# Patient Record
Sex: FEMALE | Race: WHITE | Employment: FULL TIME | URBAN - METROPOLITAN AREA
[De-identification: names, ages, dates, MRNs, and addresses within clinical notes are randomized per-mention and may not be internally consistent; named-entity substitution may affect disease eponyms.]

---

## 2017-02-24 PROBLEM — E03.9 ACQUIRED HYPOTHYROIDISM: Status: ACTIVE | Noted: 2017-02-24

## 2018-03-19 ENCOUNTER — HOSPITAL ENCOUNTER (OUTPATIENT)
Age: 33
Setting detail: SPECIMEN
Discharge: HOME | End: 2018-03-19
Payer: COMMERCIAL

## 2018-03-19 ENCOUNTER — OFFICE VISIT (OUTPATIENT)
Dept: FAMILY MEDICINE CLINIC | Age: 33
End: 2018-03-19
Payer: COMMERCIAL

## 2018-03-19 VITALS
SYSTOLIC BLOOD PRESSURE: 126 MMHG | HEART RATE: 88 BPM | DIASTOLIC BLOOD PRESSURE: 86 MMHG | OXYGEN SATURATION: 99 % | RESPIRATION RATE: 16 BRPM | HEIGHT: 64 IN | TEMPERATURE: 98.3 F | WEIGHT: 170 LBS | BODY MASS INDEX: 29.02 KG/M2

## 2018-03-19 DIAGNOSIS — N63.20 LEFT BREAST MASS: ICD-10-CM

## 2018-03-19 DIAGNOSIS — Z01.419 WELL WOMAN EXAM WITH ROUTINE GYNECOLOGICAL EXAM: Primary | ICD-10-CM

## 2018-03-19 PROCEDURE — 99395 PREV VISIT EST AGE 18-39: CPT | Performed by: FAMILY MEDICINE

## 2018-03-19 PROCEDURE — 87591 N.GONORRHOEAE DNA AMP PROB: CPT

## 2018-03-19 PROCEDURE — 88175 CYTOPATH C/V AUTO FLUID REDO: CPT

## 2018-03-19 PROCEDURE — 87491 CHLMYD TRACH DNA AMP PROBE: CPT

## 2018-03-19 RX ORDER — DOCUSATE SODIUM 100 MG/1
100 CAPSULE, LIQUID FILLED ORAL DAILY
COMMUNITY
End: 2021-09-01

## 2018-03-21 LAB
CHLAMYDIA TRACHOMATIS AMPLIFIED DET: NORMAL
N GONORRHOEAE AMPLIFIED DET: NORMAL

## 2018-06-20 ENCOUNTER — OFFICE VISIT (OUTPATIENT)
Dept: FAMILY MEDICINE CLINIC | Age: 33
End: 2018-06-20
Payer: COMMERCIAL

## 2018-06-20 VITALS
OXYGEN SATURATION: 100 % | WEIGHT: 166 LBS | HEART RATE: 88 BPM | SYSTOLIC BLOOD PRESSURE: 116 MMHG | DIASTOLIC BLOOD PRESSURE: 83 MMHG | HEIGHT: 64 IN | TEMPERATURE: 98.3 F | BODY MASS INDEX: 28.34 KG/M2 | RESPIRATION RATE: 18 BRPM
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
## 2018-06-20 DIAGNOSIS — M79.632 PAIN OF LEFT FOREARM: Primary | ICD-10-CM

## 2018-06-20 PROCEDURE — 90471 IMMUNIZATION ADMIN: CPT | Performed by: FAMILY MEDICINE

## 2018-06-20 PROCEDURE — G8427 DOCREV CUR MEDS BY ELIG CLIN: HCPCS | Performed by: FAMILY MEDICINE

## 2018-06-20 PROCEDURE — G8417 CALC BMI ABV UP PARAM F/U: HCPCS | Performed by: FAMILY MEDICINE

## 2018-06-20 PROCEDURE — 90715 TDAP VACCINE 7 YRS/> IM: CPT | Performed by: FAMILY MEDICINE

## 2018-06-20 PROCEDURE — 1036F TOBACCO NON-USER: CPT | Performed by: FAMILY MEDICINE

## 2018-06-20 PROCEDURE — 99213 OFFICE O/P EST LOW 20 MIN: CPT | Performed by: FAMILY MEDICINE

## 2018-06-20 RX ORDER — AMOXICILLIN AND CLAVULANATE POTASSIUM 875; 125 MG/1; MG/1
1 TABLET, FILM COATED ORAL 2 TIMES DAILY
Qty: 28 TABLET | Refills: 0 | Status: SHIPPED | OUTPATIENT
Start: 2018-06-20 | End: 2018-07-04

## 2018-08-06 ENCOUNTER — NURSE ONLY (OUTPATIENT)
Dept: FAMILY MEDICINE CLINIC | Age: 33
End: 2018-08-06

## 2018-08-06 ENCOUNTER — HOSPITAL ENCOUNTER (OUTPATIENT)
Age: 33
Discharge: HOME | End: 2018-08-08
Payer: COMMERCIAL

## 2018-08-06 DIAGNOSIS — E03.9 ACQUIRED HYPOTHYROIDISM: ICD-10-CM

## 2018-08-06 DIAGNOSIS — E53.8 B12 DEFICIENCY: ICD-10-CM

## 2018-08-06 DIAGNOSIS — E55.9 VITAMIN D DEFICIENCY: ICD-10-CM

## 2018-08-06 DIAGNOSIS — E03.9 ACQUIRED HYPOTHYROIDISM: Primary | ICD-10-CM

## 2018-08-06 LAB
ALBUMIN SERPL-MCNC: 4.1 G/DL (ref 3.5–5.2)
ALP BLD-CCNC: 85 U/L (ref 35–104)
ALT SERPL-CCNC: 22 U/L (ref 0–32)
ANION GAP SERPL CALCULATED.3IONS-SCNC: 12 MMOL/L (ref 7–16)
AST SERPL-CCNC: 27 U/L (ref 0–31)
BILIRUB SERPL-MCNC: 0.4 MG/DL (ref 0–1.2)
BUN BLDV-MCNC: 12 MG/DL (ref 6–20)
CALCIUM SERPL-MCNC: 9.1 MG/DL (ref 8.6–10.2)
CHLORIDE BLD-SCNC: 99 MMOL/L (ref 98–107)
CO2: 30 MMOL/L (ref 22–29)
CREAT SERPL-MCNC: 0.8 MG/DL (ref 0.5–1)
FOLATE: >20 NG/ML (ref 4.8–24.2)
GFR AFRICAN AMERICAN: >60
GFR NON-AFRICAN AMERICAN: >60 ML/MIN/1.73
GLUCOSE BLD-MCNC: 79 MG/DL (ref 74–109)
HCT VFR BLD CALC: 45.4 % (ref 34–48)
HEMOGLOBIN: 14.2 G/DL (ref 11.5–15.5)
MCH RBC QN AUTO: 30.9 PG (ref 26–35)
MCHC RBC AUTO-ENTMCNC: 31.3 % (ref 32–34.5)
MCV RBC AUTO: 98.7 FL (ref 80–99.9)
PDW BLD-RTO: 12.4 FL (ref 11.5–15)
PLATELET # BLD: 207 E9/L (ref 130–450)
PMV BLD AUTO: 10.3 FL (ref 7–12)
POTASSIUM SERPL-SCNC: 5 MMOL/L (ref 3.5–5)
RBC # BLD: 4.6 E12/L (ref 3.5–5.5)
SODIUM BLD-SCNC: 141 MMOL/L (ref 132–146)
TOTAL PROTEIN: 6.9 G/DL (ref 6.4–8.3)
TSH SERPL DL<=0.05 MIU/L-ACNC: 3.78 UIU/ML (ref 0.27–4.2)
VITAMIN B-12: 1184 PG/ML (ref 211–946)
VITAMIN D 25-HYDROXY: 75 NG/ML (ref 30–100)
WBC # BLD: 6.5 E9/L (ref 4.5–11.5)

## 2018-08-06 PROCEDURE — 85027 COMPLETE CBC AUTOMATED: CPT

## 2018-08-06 PROCEDURE — 82607 VITAMIN B-12: CPT

## 2018-08-06 PROCEDURE — 82306 VITAMIN D 25 HYDROXY: CPT

## 2018-08-06 PROCEDURE — 82746 ASSAY OF FOLIC ACID SERUM: CPT

## 2018-08-06 PROCEDURE — 80053 COMPREHEN METABOLIC PANEL: CPT

## 2018-08-06 PROCEDURE — 84443 ASSAY THYROID STIM HORMONE: CPT

## 2018-08-13 ENCOUNTER — OFFICE VISIT (OUTPATIENT)
Dept: FAMILY MEDICINE CLINIC | Age: 33
End: 2018-08-13
Payer: COMMERCIAL

## 2018-08-13 VITALS
HEART RATE: 90 BPM | OXYGEN SATURATION: 98 % | WEIGHT: 163 LBS | RESPIRATION RATE: 16 BRPM | SYSTOLIC BLOOD PRESSURE: 119 MMHG | DIASTOLIC BLOOD PRESSURE: 80 MMHG | BODY MASS INDEX: 27.83 KG/M2 | HEIGHT: 64 IN

## 2018-08-13 DIAGNOSIS — E03.9 ACQUIRED HYPOTHYROIDISM: Primary | ICD-10-CM

## 2018-08-13 PROCEDURE — 1036F TOBACCO NON-USER: CPT | Performed by: FAMILY MEDICINE

## 2018-08-13 PROCEDURE — 99213 OFFICE O/P EST LOW 20 MIN: CPT | Performed by: FAMILY MEDICINE

## 2018-08-13 PROCEDURE — G8417 CALC BMI ABV UP PARAM F/U: HCPCS | Performed by: FAMILY MEDICINE

## 2018-08-13 PROCEDURE — G8427 DOCREV CUR MEDS BY ELIG CLIN: HCPCS | Performed by: FAMILY MEDICINE

## 2018-08-13 RX ORDER — ALBUTEROL SULFATE 90 UG/1
2 AEROSOL, METERED RESPIRATORY (INHALATION) EVERY 6 HOURS PRN
Qty: 3 INHALER | Refills: 3 | Status: SHIPPED
Start: 2018-08-13 | End: 2020-04-09 | Stop reason: SDUPTHER

## 2018-08-13 RX ORDER — LEVOTHYROXINE SODIUM 0.07 MG/1
75 TABLET ORAL DAILY
Qty: 90 TABLET | Refills: 3 | Status: SHIPPED | OUTPATIENT
Start: 2018-08-13 | End: 2019-02-11 | Stop reason: SDUPTHER

## 2018-08-13 ASSESSMENT — PATIENT HEALTH QUESTIONNAIRE - PHQ9
2. FEELING DOWN, DEPRESSED OR HOPELESS: 0
1. LITTLE INTEREST OR PLEASURE IN DOING THINGS: 0
SUM OF ALL RESPONSES TO PHQ9 QUESTIONS 1 & 2: 0
SUM OF ALL RESPONSES TO PHQ QUESTIONS 1-9: 0
SUM OF ALL RESPONSES TO PHQ QUESTIONS 1-9: 0

## 2019-02-08 ENCOUNTER — NURSE ONLY (OUTPATIENT)
Dept: FAMILY MEDICINE CLINIC | Age: 34
End: 2019-02-08
Payer: COMMERCIAL

## 2019-02-08 ENCOUNTER — HOSPITAL ENCOUNTER (OUTPATIENT)
Age: 34
Discharge: HOME | End: 2019-02-10
Payer: COMMERCIAL

## 2019-02-08 DIAGNOSIS — E03.9 ACQUIRED HYPOTHYROIDISM: ICD-10-CM

## 2019-02-08 LAB
ALBUMIN SERPL-MCNC: 4.1 G/DL (ref 3.5–5.2)
ALP BLD-CCNC: 75 U/L (ref 35–104)
ALT SERPL-CCNC: 32 U/L (ref 0–32)
ANION GAP SERPL CALCULATED.3IONS-SCNC: 12 MMOL/L (ref 7–16)
AST SERPL-CCNC: 38 U/L (ref 0–31)
BILIRUB SERPL-MCNC: 0.4 MG/DL (ref 0–1.2)
BUN BLDV-MCNC: 14 MG/DL (ref 6–20)
CALCIUM SERPL-MCNC: 9 MG/DL (ref 8.6–10.2)
CHLORIDE BLD-SCNC: 101 MMOL/L (ref 98–107)
CHOLESTEROL, TOTAL: 177 MG/DL (ref 0–199)
CO2: 22 MMOL/L (ref 22–29)
CREAT SERPL-MCNC: 0.8 MG/DL (ref 0.5–1)
GFR AFRICAN AMERICAN: >60
GFR NON-AFRICAN AMERICAN: >60 ML/MIN/1.73
GLUCOSE BLD-MCNC: 83 MG/DL (ref 74–99)
HDLC SERPL-MCNC: 70 MG/DL
LDL CHOLESTEROL CALCULATED: 97 MG/DL (ref 0–99)
POTASSIUM SERPL-SCNC: 4.3 MMOL/L (ref 3.5–5)
SODIUM BLD-SCNC: 135 MMOL/L (ref 132–146)
TOTAL PROTEIN: 7 G/DL (ref 6.4–8.3)
TRIGL SERPL-MCNC: 50 MG/DL (ref 0–149)
TSH SERPL DL<=0.05 MIU/L-ACNC: 5.32 UIU/ML (ref 0.27–4.2)
VLDLC SERPL CALC-MCNC: 10 MG/DL

## 2019-02-08 PROCEDURE — 80053 COMPREHEN METABOLIC PANEL: CPT

## 2019-02-08 PROCEDURE — 36415 COLL VENOUS BLD VENIPUNCTURE: CPT | Performed by: FAMILY MEDICINE

## 2019-02-08 PROCEDURE — 80061 LIPID PANEL: CPT

## 2019-02-08 PROCEDURE — 84443 ASSAY THYROID STIM HORMONE: CPT

## 2019-02-11 ENCOUNTER — OFFICE VISIT (OUTPATIENT)
Dept: FAMILY MEDICINE CLINIC | Age: 34
End: 2019-02-11
Payer: COMMERCIAL

## 2019-02-11 VITALS
HEART RATE: 94 BPM | HEIGHT: 64 IN | OXYGEN SATURATION: 100 % | SYSTOLIC BLOOD PRESSURE: 117 MMHG | WEIGHT: 173 LBS | BODY MASS INDEX: 29.53 KG/M2 | DIASTOLIC BLOOD PRESSURE: 80 MMHG | RESPIRATION RATE: 16 BRPM

## 2019-02-11 DIAGNOSIS — Z00.00 WELL ADULT EXAM: Primary | ICD-10-CM

## 2019-02-11 PROCEDURE — 1036F TOBACCO NON-USER: CPT | Performed by: FAMILY MEDICINE

## 2019-02-11 PROCEDURE — G8417 CALC BMI ABV UP PARAM F/U: HCPCS | Performed by: FAMILY MEDICINE

## 2019-02-11 PROCEDURE — G8484 FLU IMMUNIZE NO ADMIN: HCPCS | Performed by: FAMILY MEDICINE

## 2019-02-11 PROCEDURE — G8427 DOCREV CUR MEDS BY ELIG CLIN: HCPCS | Performed by: FAMILY MEDICINE

## 2019-02-11 PROCEDURE — 99395 PREV VISIT EST AGE 18-39: CPT | Performed by: FAMILY MEDICINE

## 2019-02-11 RX ORDER — LEVOTHYROXINE SODIUM 88 UG/1
88 TABLET ORAL DAILY
Qty: 90 TABLET | Refills: 3 | Status: SHIPPED | OUTPATIENT
Start: 2019-02-11 | End: 2020-01-20

## 2019-04-08 ENCOUNTER — HOSPITAL ENCOUNTER (OUTPATIENT)
Age: 34
Discharge: HOME | End: 2019-04-10
Payer: COMMERCIAL

## 2019-04-08 ENCOUNTER — NURSE ONLY (OUTPATIENT)
Dept: FAMILY MEDICINE CLINIC | Age: 34
End: 2019-04-08

## 2019-04-08 DIAGNOSIS — E03.9 ACQUIRED HYPOTHYROIDISM: ICD-10-CM

## 2019-04-08 DIAGNOSIS — E03.9 ACQUIRED HYPOTHYROIDISM: Primary | ICD-10-CM

## 2019-04-08 PROCEDURE — 84443 ASSAY THYROID STIM HORMONE: CPT

## 2019-04-09 LAB — TSH SERPL DL<=0.05 MIU/L-ACNC: 2.97 UIU/ML (ref 0.27–4.2)

## 2019-05-25 ENCOUNTER — HOSPITAL ENCOUNTER (EMERGENCY)
Age: 34
Discharge: HOME | End: 2019-05-25
Attending: EMERGENCY MEDICINE
Payer: COMMERCIAL

## 2019-05-25 VITALS
DIASTOLIC BLOOD PRESSURE: 84 MMHG | OXYGEN SATURATION: 98 % | BODY MASS INDEX: 29.02 KG/M2 | WEIGHT: 170 LBS | HEIGHT: 64 IN | TEMPERATURE: 98.6 F | RESPIRATION RATE: 18 BRPM | SYSTOLIC BLOOD PRESSURE: 128 MMHG | HEART RATE: 100 BPM

## 2019-05-25 DIAGNOSIS — S05.01XA ABRASION OF RIGHT CORNEA, INITIAL ENCOUNTER: Primary | ICD-10-CM

## 2019-05-25 PROCEDURE — 99283 EMERGENCY DEPT VISIT LOW MDM: CPT

## 2019-05-25 PROCEDURE — 6370000000 HC RX 637 (ALT 250 FOR IP): Performed by: EMERGENCY MEDICINE

## 2019-05-25 PROCEDURE — 6370000000 HC RX 637 (ALT 250 FOR IP)

## 2019-05-25 RX ORDER — TETRACAINE HYDROCHLORIDE 5 MG/ML
1 SOLUTION OPHTHALMIC ONCE
Status: COMPLETED | OUTPATIENT
Start: 2019-05-25 | End: 2019-05-25

## 2019-05-25 RX ORDER — NEOMYCIN SULFATE, POLYMYXIN B SULFATE AND DEXAMETHASONE 3.5; 10000; 1 MG/ML; [USP'U]/ML; MG/ML
1 SUSPENSION/ DROPS OPHTHALMIC ONCE
Status: COMPLETED | OUTPATIENT
Start: 2019-05-25 | End: 2019-05-25

## 2019-05-25 RX ORDER — TETRACAINE HYDROCHLORIDE 5 MG/ML
SOLUTION OPHTHALMIC
Status: COMPLETED
Start: 2019-05-25 | End: 2019-05-25

## 2019-05-25 RX ADMIN — FLUORESCEIN SODIUM 1 MG: 1 STRIP OPHTHALMIC at 23:20

## 2019-05-25 RX ADMIN — TETRACAINE HYDROCHLORIDE 1 DROP: 5 SOLUTION OPHTHALMIC at 23:20

## 2019-05-25 RX ADMIN — NEOMYCIN SULFATE, POLYMYXIN B SULFATE AND DEXAMETHASONE 1 DROP: 3.5; 10000; 1 SUSPENSION/ DROPS OPHTHALMIC at 23:20

## 2019-05-25 ASSESSMENT — PAIN DESCRIPTION - FREQUENCY: FREQUENCY: CONTINUOUS

## 2019-05-25 ASSESSMENT — PAIN DESCRIPTION - DESCRIPTORS: DESCRIPTORS: SHARP

## 2019-05-25 ASSESSMENT — PAIN SCALES - GENERAL: PAINLEVEL_OUTOF10: 4

## 2019-05-25 ASSESSMENT — PAIN DESCRIPTION - ORIENTATION: ORIENTATION: RIGHT

## 2019-05-25 ASSESSMENT — PAIN DESCRIPTION - PAIN TYPE: TYPE: ACUTE PAIN

## 2019-05-25 ASSESSMENT — PAIN DESCRIPTION - LOCATION: LOCATION: EYE

## 2019-05-25 ASSESSMENT — PAIN DESCRIPTION - PROGRESSION: CLINICAL_PROGRESSION: GRADUALLY WORSENING

## 2019-05-26 NOTE — ED PROVIDER NOTES
Department of Emergency Medicine   ED  Provider Note  Admit Date/RoomTime: 5/25/2019 11:02 PM  ED Room: 06/06          History of Present Illness:  5/25/19, Time: 11:10 PM         Kirti Camejo is a 36 y.o. female presenting to the ED for foreign body, beginning prior to arrival.  The complaint has been persistent, moderate in severity, and worsened by movement of right eye. Pt states that she was putting food in her cabinet when a glass broke. States that small pieces of it got into her right eye. Pt was wearing contacts at that time. States that since then, she has had pain upon moving her eye and redness. Pt did flushed her eye but continued to have pain. Denies chest pain, shortness of breath, fever, chills, abdominal pain, nausea, emesis, diarrhea, and further complaints at this time. Review of Systems:   Pertinent positives and negatives are stated within HPI, all other systems reviewed and are negative.    --------------------------------------------- PAST HISTORY ---------------------------------------------  Past Medical History:  has a past medical history of Allergic rhinitis, Anxiety, Depression, Hypothyroidism, Obesity, and Sleep apnea. Past Surgical History:  has a past surgical history that includes Bariatric Surgery (06/14/2016) and Foot surgery (12/10/2015). Social History:  reports that she has never smoked. She has never used smokeless tobacco. She reports that she does not use drugs. Family History: family history includes Bipolar Disorder in her sister; Breast Cancer in her paternal grandmother; Depression in her father and mother; Heart Attack in her paternal grandfather; High Blood Pressure in her father; No Known Problems in her sister; Thyroid Disease in her mother. The patients home medications have been reviewed.     Allergies: Adhesive tape    -------------------------------------------------- RESULTS -------------------------------------------------  All laboratory and radiology results have been personally reviewed by myself   LABS:  No results found for this visit on 05/25/19. RADIOLOGY:  Interpreted by Radiologist.  No orders to display       ------------------------- NURSING NOTES AND VITALS REVIEWED ---------------------------   The nursing notes within the ED encounter and vital signs as below have been reviewed. /84   Pulse 100   Temp 98.6 °F (37 °C) (Oral)   Resp 18   Ht 5' 4\" (1.626 m)   Wt 170 lb (77.1 kg)   SpO2 98%   BMI 29.18 kg/m²   Oxygen Saturation Interpretation: Normal    ---------------------------------------------------PHYSICAL EXAM--------------------------------------    Constitutional/General: Alert and oriented x3, well appearing, non toxic in NAD  Head: NC/AT  Eyes: PERRL, EOMI  Mouth: Oropharynx clear, handling secretions  Neck: Supple, full ROM,   Pulmonary: Lungs clear to auscultation bilaterally, no wheezes, rales, or rhonchi. Not in respiratory distress  Cardiovascular:  Regular rate and rhythm, no murmurs, gallops, or rubs. 2+ distal pulses  Abdomen: Soft, non tender, non distended,   Musculoskeletal: Moves all extremities x 4. Warm and well perfused  Skin: warm and dry without rash  Neurologic: GCS 15,  Psych: Normal Affect      ------------------------------ ED COURSE/MEDICAL DECISION MAKING----------------------  Medications   neomycin-polymyxin-dexameth (MAXITROL) 3.5-34619-8.1 ophthalmic suspension 1 drop (1 drop Right Eye Given 5/25/19 2320)   tetracaine (TETRAVISC) 0.5 % ophthalmic solution 1 drop (1 drop Right Eye Given 5/25/19 2320)   fluorescein ophthalmic strip 1 mg (1 mg Right Eye Given 5/25/19 2320)       Medical Decision Making:    Patient comes to the ED with foreign body in her right eye. States that a piece of broken glass got in her right eye. Was wearing contacts at that time. Continued to have pain and redness after flushing eye at home.  Had fluorescein eye exam which showed small superficial lacerations to the 4-5 o'clock position. No glass or foreign body seen. Patient feels better after medicating with Maxitrol. Will be discharged with medication. Also advised to have follow up with PCP and eye doctor. FLUORESCEIN EYE EXAM:  Performed By: Lu Cortes DO. Location:  Right Eye  Procedure: Tetracaine was used for local anesthesia. Fluorescein strip was used. The lid was everted. Full extraocular motions were evaluated and are intact. Flourescein stain: Positive. Findings: Has small superficial lacerations to the 4 to 5 o'clock position. No foreign body was found. Counseling: The emergency provider has spoken with the patient and discussed todays results, in addition to providing specific details for the plan of care and counseling regarding the diagnosis and prognosis. Questions are answered at this time and they are agreeable with the plan.      --------------------------------- IMPRESSION AND DISPOSITION ---------------------------------    IMPRESSION  1. Abrasion of right cornea, initial encounter        DISPOSITION  Disposition: Discharge to home  Patient condition is stable    5/25/19, 11:10 PM.    This note is prepared by Ankita Sellers acting as Scribe for Lu Cortes DO. Lu Cortes DO:  The scribe's documentation has been prepared under my direction and personally reviewed by me in its entirety. I confirm that the note above accurately reflects all work, treatment, procedures, and medical decision making performed by me.            Lu Cortes DO  05/26/19 7401

## 2019-05-26 NOTE — ED NOTES
Right eye flushed with NSS via Chela Lowe lens. 1 drop Maxitrol to right eye as ordered.      Chelsey Houston RN  05/25/19 [id number]

## 2019-05-26 NOTE — ED NOTES
Right eye irrigation initiated with ning lens per verbal order from Dr. Arthur German with 500 cc of NS.        Dolly Briceño RN  05/25/19 1784

## 2019-05-26 NOTE — ED NOTES
Instructions provided and questions answered. Eye drops provided for home with instruction.      Isaak Healy RN  05/25/19 7155

## 2020-01-20 RX ORDER — LEVOTHYROXINE SODIUM 88 UG/1
TABLET ORAL
Qty: 90 TABLET | Refills: 4 | Status: SHIPPED
Start: 2020-01-20 | End: 2021-04-14

## 2020-04-09 ENCOUNTER — HOSPITAL ENCOUNTER (OUTPATIENT)
Age: 35
Discharge: HOME | End: 2020-04-09
Payer: COMMERCIAL

## 2020-04-09 LAB
ALBUMIN SERPL-MCNC: 4.3 G/DL (ref 3.5–5.2)
ALP BLD-CCNC: 99 U/L (ref 35–104)
ALT SERPL-CCNC: 36 U/L (ref 0–32)
ANION GAP SERPL CALCULATED.3IONS-SCNC: 11 MMOL/L (ref 7–16)
AST SERPL-CCNC: 34 U/L (ref 0–31)
BILIRUB SERPL-MCNC: 0.3 MG/DL (ref 0–1.2)
BUN BLDV-MCNC: 16 MG/DL (ref 6–20)
CALCIUM SERPL-MCNC: 9.4 MG/DL (ref 8.6–10.2)
CHLORIDE BLD-SCNC: 101 MMOL/L (ref 98–107)
CO2: 26 MMOL/L (ref 22–29)
CREAT SERPL-MCNC: 0.8 MG/DL (ref 0.5–1)
GFR AFRICAN AMERICAN: >60
GFR NON-AFRICAN AMERICAN: >60 ML/MIN/1.73
GLUCOSE BLD-MCNC: 116 MG/DL (ref 74–99)
HCT VFR BLD CALC: 47 % (ref 34–48)
HEMOGLOBIN: 15.6 G/DL (ref 11.5–15.5)
MCH RBC QN AUTO: 31.1 PG (ref 26–35)
MCHC RBC AUTO-ENTMCNC: 33.2 % (ref 32–34.5)
MCV RBC AUTO: 93.8 FL (ref 80–99.9)
PDW BLD-RTO: 11.7 FL (ref 11.5–15)
PLATELET # BLD: 217 E9/L (ref 130–450)
PMV BLD AUTO: 9.8 FL (ref 7–12)
POTASSIUM SERPL-SCNC: 4 MMOL/L (ref 3.5–5)
RBC # BLD: 5.01 E12/L (ref 3.5–5.5)
SODIUM BLD-SCNC: 138 MMOL/L (ref 132–146)
TOTAL PROTEIN: 7.3 G/DL (ref 6.4–8.3)
WBC # BLD: 6.4 E9/L (ref 4.5–11.5)

## 2020-04-09 PROCEDURE — 36415 COLL VENOUS BLD VENIPUNCTURE: CPT

## 2020-04-09 PROCEDURE — 84443 ASSAY THYROID STIM HORMONE: CPT

## 2020-04-09 PROCEDURE — 80053 COMPREHEN METABOLIC PANEL: CPT

## 2020-04-09 PROCEDURE — 85027 COMPLETE CBC AUTOMATED: CPT

## 2020-04-10 LAB — TSH SERPL DL<=0.05 MIU/L-ACNC: 2.76 UIU/ML (ref 0.27–4.2)

## 2021-04-14 RX ORDER — LEVOTHYROXINE SODIUM 88 UG/1
TABLET ORAL
Qty: 90 TABLET | Refills: 3 | Status: SHIPPED
Start: 2021-04-14 | End: 2021-04-21 | Stop reason: SDUPTHER

## 2021-04-21 ENCOUNTER — OFFICE VISIT (OUTPATIENT)
Dept: FAMILY MEDICINE CLINIC | Age: 36
End: 2021-04-21
Payer: COMMERCIAL

## 2021-04-21 VITALS
SYSTOLIC BLOOD PRESSURE: 124 MMHG | HEART RATE: 98 BPM | TEMPERATURE: 97.7 F | WEIGHT: 179 LBS | HEIGHT: 64 IN | BODY MASS INDEX: 30.56 KG/M2 | OXYGEN SATURATION: 99 % | RESPIRATION RATE: 18 BRPM | DIASTOLIC BLOOD PRESSURE: 70 MMHG

## 2021-04-21 DIAGNOSIS — F41.9 ANXIETY: ICD-10-CM

## 2021-04-21 DIAGNOSIS — Z00.00 HEALTHCARE MAINTENANCE: ICD-10-CM

## 2021-04-21 DIAGNOSIS — F32.5 MAJOR DEPRESSIVE DISORDER WITH SINGLE EPISODE, IN FULL REMISSION (HCC): ICD-10-CM

## 2021-04-21 DIAGNOSIS — Z76.89 ENCOUNTER TO ESTABLISH CARE: ICD-10-CM

## 2021-04-21 DIAGNOSIS — R73.9 ELEVATED BLOOD SUGAR: ICD-10-CM

## 2021-04-21 DIAGNOSIS — E55.9 VITAMIN D DEFICIENCY, UNSPECIFIED: ICD-10-CM

## 2021-04-21 DIAGNOSIS — E03.9 ACQUIRED HYPOTHYROIDISM: ICD-10-CM

## 2021-04-21 DIAGNOSIS — Z01.84 IMMUNITY STATUS TESTING: ICD-10-CM

## 2021-04-21 DIAGNOSIS — E03.9 ACQUIRED HYPOTHYROIDISM: Primary | ICD-10-CM

## 2021-04-21 DIAGNOSIS — J30.2 SEASONAL ALLERGIES: ICD-10-CM

## 2021-04-21 DIAGNOSIS — Z98.84 HISTORY OF GASTRIC RESTRICTIVE SURGERY: ICD-10-CM

## 2021-04-21 DIAGNOSIS — J45.990 EXERCISE-INDUCED ASTHMA: ICD-10-CM

## 2021-04-21 LAB
ALBUMIN SERPL-MCNC: 4.3 G/DL (ref 3.5–5.2)
ALP BLD-CCNC: 97 U/L (ref 35–104)
ALT SERPL-CCNC: 29 U/L (ref 0–32)
ANION GAP SERPL CALCULATED.3IONS-SCNC: 9 MMOL/L (ref 7–16)
AST SERPL-CCNC: 32 U/L (ref 0–31)
BASOPHILS ABSOLUTE: 0.03 E9/L (ref 0–0.2)
BASOPHILS RELATIVE PERCENT: 0.7 % (ref 0–2)
BILIRUB SERPL-MCNC: 0.3 MG/DL (ref 0–1.2)
BUN BLDV-MCNC: 14 MG/DL (ref 6–20)
CALCIUM SERPL-MCNC: 9.3 MG/DL (ref 8.6–10.2)
CHLORIDE BLD-SCNC: 102 MMOL/L (ref 98–107)
CHOLESTEROL, TOTAL: 199 MG/DL (ref 0–199)
CO2: 28 MMOL/L (ref 22–29)
CREAT SERPL-MCNC: 0.8 MG/DL (ref 0.5–1)
EOSINOPHILS ABSOLUTE: 0.01 E9/L (ref 0.05–0.5)
EOSINOPHILS RELATIVE PERCENT: 0.2 % (ref 0–6)
GFR AFRICAN AMERICAN: >60
GFR NON-AFRICAN AMERICAN: >60 ML/MIN/1.73
GLUCOSE BLD-MCNC: 80 MG/DL (ref 74–99)
HBA1C MFR BLD: 5 % (ref 4–5.6)
HCT VFR BLD CALC: 49 % (ref 34–48)
HDLC SERPL-MCNC: 67 MG/DL
HEMOGLOBIN: 15.8 G/DL (ref 11.5–15.5)
IMMATURE GRANULOCYTES #: 0 E9/L
IMMATURE GRANULOCYTES %: 0 % (ref 0–5)
LDL CHOLESTEROL CALCULATED: 122 MG/DL (ref 0–99)
LYMPHOCYTES ABSOLUTE: 1.38 E9/L (ref 1.5–4)
LYMPHOCYTES RELATIVE PERCENT: 32.9 % (ref 20–42)
MCH RBC QN AUTO: 30.5 PG (ref 26–35)
MCHC RBC AUTO-ENTMCNC: 32.2 % (ref 32–34.5)
MCV RBC AUTO: 94.6 FL (ref 80–99.9)
MONOCYTES ABSOLUTE: 0.35 E9/L (ref 0.1–0.95)
MONOCYTES RELATIVE PERCENT: 8.4 % (ref 2–12)
NEUTROPHILS ABSOLUTE: 2.42 E9/L (ref 1.8–7.3)
NEUTROPHILS RELATIVE PERCENT: 57.8 % (ref 43–80)
PDW BLD-RTO: 12 FL (ref 11.5–15)
PLATELET # BLD: 261 E9/L (ref 130–450)
PMV BLD AUTO: 10.2 FL (ref 7–12)
POTASSIUM SERPL-SCNC: 4.2 MMOL/L (ref 3.5–5)
RBC # BLD: 5.18 E12/L (ref 3.5–5.5)
SODIUM BLD-SCNC: 139 MMOL/L (ref 132–146)
TOTAL PROTEIN: 7.5 G/DL (ref 6.4–8.3)
TRIGL SERPL-MCNC: 52 MG/DL (ref 0–149)
TSH SERPL DL<=0.05 MIU/L-ACNC: 2.52 UIU/ML (ref 0.27–4.2)
VITAMIN D 25-HYDROXY: 90 NG/ML (ref 30–100)
VLDLC SERPL CALC-MCNC: 10 MG/DL
WBC # BLD: 4.2 E9/L (ref 4.5–11.5)

## 2021-04-21 PROCEDURE — 99214 OFFICE O/P EST MOD 30 MIN: CPT | Performed by: FAMILY MEDICINE

## 2021-04-21 PROCEDURE — G8417 CALC BMI ABV UP PARAM F/U: HCPCS | Performed by: FAMILY MEDICINE

## 2021-04-21 PROCEDURE — 1036F TOBACCO NON-USER: CPT | Performed by: FAMILY MEDICINE

## 2021-04-21 PROCEDURE — G8427 DOCREV CUR MEDS BY ELIG CLIN: HCPCS | Performed by: FAMILY MEDICINE

## 2021-04-21 RX ORDER — LEVOTHYROXINE SODIUM 88 UG/1
88 TABLET ORAL DAILY
Qty: 90 TABLET | Refills: 3 | Status: SHIPPED
Start: 2021-04-21 | End: 2022-01-19 | Stop reason: SDUPTHER

## 2021-04-21 SDOH — ECONOMIC STABILITY: TRANSPORTATION INSECURITY
IN THE PAST 12 MONTHS, HAS THE LACK OF TRANSPORTATION KEPT YOU FROM MEDICAL APPOINTMENTS OR FROM GETTING MEDICATIONS?: NO

## 2021-04-21 SDOH — HEALTH STABILITY: MENTAL HEALTH: HOW OFTEN DO YOU HAVE A DRINK CONTAINING ALCOHOL?: NEVER

## 2021-04-21 SDOH — ECONOMIC STABILITY: INCOME INSECURITY: HOW HARD IS IT FOR YOU TO PAY FOR THE VERY BASICS LIKE FOOD, HOUSING, MEDICAL CARE, AND HEATING?: NOT HARD AT ALL

## 2021-04-21 SDOH — HEALTH STABILITY: MENTAL HEALTH: HOW MANY STANDARD DRINKS CONTAINING ALCOHOL DO YOU HAVE ON A TYPICAL DAY?: NOT ASKED

## 2021-04-21 SDOH — ECONOMIC STABILITY: TRANSPORTATION INSECURITY
IN THE PAST 12 MONTHS, HAS LACK OF TRANSPORTATION KEPT YOU FROM MEETINGS, WORK, OR FROM GETTING THINGS NEEDED FOR DAILY LIVING?: NO

## 2021-04-21 SDOH — ECONOMIC STABILITY: FOOD INSECURITY: WITHIN THE PAST 12 MONTHS, YOU WORRIED THAT YOUR FOOD WOULD RUN OUT BEFORE YOU GOT MONEY TO BUY MORE.: NEVER TRUE

## 2021-04-21 ASSESSMENT — PATIENT HEALTH QUESTIONNAIRE - PHQ9
SUM OF ALL RESPONSES TO PHQ QUESTIONS 1-9: 0
SUM OF ALL RESPONSES TO PHQ QUESTIONS 1-9: 0
2. FEELING DOWN, DEPRESSED OR HOPELESS: 0
SUM OF ALL RESPONSES TO PHQ9 QUESTIONS 1 & 2: 0
1. LITTLE INTEREST OR PLEASURE IN DOING THINGS: 0

## 2021-04-21 NOTE — PROGRESS NOTES
CC: Jaylin Briseno is a 29 y.o. yo female here for evaluation of the following medical concerns: New Patient      HPI:    Establish care    Hypothyroidism - on synthroi 88mcg  Exercise induced asthma - on albuterol PNR  Seasonal allergies - on zyrtec  Anxiety and depression - on wellubtrin and effexor; [facility]  She has a hx of hospitalization 2005 for SI; had 2 weeks of OP therapy and then started these medications and she is reluctant about discontinuing them; Cannot do effexor xr odue to hx of gastric sleeve  S/p Gastric sleeve 2016 at [facility] - weight has been mostly steady; did gain 10lbs from covid; has nutritionist plan; not consistent with what she eats; 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  ZIGGY resolved after gastric sleeve      ROS negative unless otherwise noted    Vitals:  /70   Pulse 98   Temp 97.7 °F (36.5 °C)   Resp 18   Ht 5' 4\" (1.626 m)   Wt 179 lb (81.2 kg)   SpO2 99%   BMI 30.73 kg/m²   Wt Readings from Last 3 Encounters:   04/21/21 179 lb (81.2 kg)   05/25/19 170 lb (77.1 kg)   02/11/19 173 lb (78.5 kg)       Physical Exam:  Constitutional - alert, well appearing, and in no distress  Eyes - extraocular eye movements intact, left eye normal, right eye normal, no conjunctivitis noted  Neck - supple, no significant adenopathy; thyroid exam: thyroid is normal in size without nodules or tenderness  Respiratory- clear to auscultation, no wheezes, rales or rhonchi, symmetric air entry; no increased work of breathing  Cardiovascular - normal rate, regular rhythm, normal S1, S2, no murmurs, rubs, clicks or gallops;    Extremities - no edema noted  Abdomen - soft, nontender, nondistended  Musculoskeletal - gait normal; no clubbing, cyanosis of extremities noted; no joint deformity or swelling noted  Skin - normal coloration and turgor, no rashes, no suspicious skin lesions noted  Neurological - alert, oriented; no obvious CN deficits, normal speech, no obvious focal findings noted  Psychiatric - normal mood, behavior, speech, dress    Assessment / Plan   Diagnosis Orders   1. Acquired hypothyroidism  levothyroxine (SYNTHROID) 88 MCG tablet    Lipid Panel    CBC Auto Differential    Comprehensive Metabolic Panel    TSH without Reflex   2. Encounter to establish care     3. Exercise-induced asthma     4. Seasonal allergies     5. Major depressive disorder with single episode, in full remission ([facility] [address]  CBC Auto Differential    Comprehensive Metabolic Panel    TSH without Reflex   6. Anxiety     7. History of gastric restrictive surgery     8. Elevated blood sugar  TSH without Reflex    HEMOGLOBIN A1C   9. Healthcare maintenance  HEPATITIS C ANTIBODY    HIV-1 AND HIV-2 ANTIBODIES   10. Immunity status testing  Hepatitis B Surface Antibody   11. Vitamin D deficiency, unspecified  Vitamin D 25 Hydroxy       Labs as ordered  She will bring in records from Hep B vaccination series  [facility] which was normal.      RTO: Return in about 3 months (around 7/21/2021) for Lab Results.         An electronic signature was used to authenticate this note.  ---- Tabitha Taylor MD on 4/21/2021 at 12:33 PM

## 2021-04-22 LAB
HBV SURFACE AB TITR SER: REACTIVE {TITER}
HEPATITIS C ANTIBODY INTERPRETATION: NORMAL
HIV-1 AND HIV-2 ANTIBODIES: NORMAL

## 2021-04-23 DIAGNOSIS — D58.2 ELEVATED HEMOGLOBIN (HCC): Primary | ICD-10-CM

## 2021-07-09 RX ORDER — ALBUTEROL SULFATE 90 UG/1
2 AEROSOL, METERED RESPIRATORY (INHALATION) EVERY 6 HOURS PRN
Qty: 3 INHALER | Refills: 3 | Status: SHIPPED
Start: 2021-07-09 | End: 2022-01-19 | Stop reason: SDUPTHER

## 2021-07-09 NOTE — TELEPHONE ENCOUNTER
Medication Refill Request    LOV 2/11/2019  NOV Visit date not found    Lab Results   Component Value Date    CREATININE 0.8 04/21/2021

## 2021-09-01 ENCOUNTER — OFFICE VISIT (OUTPATIENT)
Dept: FAMILY MEDICINE CLINIC | Age: 36
End: 2021-09-01
Payer: COMMERCIAL

## 2021-09-01 VITALS
HEART RATE: 98 BPM | TEMPERATURE: 97.2 F | SYSTOLIC BLOOD PRESSURE: 118 MMHG | DIASTOLIC BLOOD PRESSURE: 78 MMHG | RESPIRATION RATE: 17 BRPM | WEIGHT: 172.4 LBS | OXYGEN SATURATION: 99 % | HEIGHT: 64 IN | BODY MASS INDEX: 29.43 KG/M2

## 2021-09-01 DIAGNOSIS — F33.42 RECURRENT MAJOR DEPRESSIVE DISORDER, IN FULL REMISSION (HCC): ICD-10-CM

## 2021-09-01 DIAGNOSIS — Z23 NEED FOR VACCINATION: ICD-10-CM

## 2021-09-01 DIAGNOSIS — J45.990 EXERCISE-INDUCED ASTHMA: ICD-10-CM

## 2021-09-01 DIAGNOSIS — Z98.84 S/P LAPAROSCOPIC SLEEVE GASTRECTOMY: ICD-10-CM

## 2021-09-01 DIAGNOSIS — E78.5 HYPERLIPIDEMIA, UNSPECIFIED HYPERLIPIDEMIA TYPE: Primary | ICD-10-CM

## 2021-09-01 DIAGNOSIS — E03.9 ACQUIRED HYPOTHYROIDISM: ICD-10-CM

## 2021-09-01 PROCEDURE — 90746 HEPB VACCINE 3 DOSE ADULT IM: CPT | Performed by: FAMILY MEDICINE

## 2021-09-01 PROCEDURE — G8427 DOCREV CUR MEDS BY ELIG CLIN: HCPCS | Performed by: FAMILY MEDICINE

## 2021-09-01 PROCEDURE — 1036F TOBACCO NON-USER: CPT | Performed by: FAMILY MEDICINE

## 2021-09-01 PROCEDURE — 90471 IMMUNIZATION ADMIN: CPT | Performed by: FAMILY MEDICINE

## 2021-09-01 PROCEDURE — G8417 CALC BMI ABV UP PARAM F/U: HCPCS | Performed by: FAMILY MEDICINE

## 2021-09-01 PROCEDURE — 99213 OFFICE O/P EST LOW 20 MIN: CPT | Performed by: FAMILY MEDICINE

## 2021-09-01 NOTE — PROGRESS NOTES
CC: Augustine Smalls is a 29 y.o. yo female is here for evaluation evaluation for the following chronic medical concerns: Hypothyroidism (here for check up and does not need any refills at this time. ), Other (informed flu vaccine due pt said will talk to  about.  brought vaccine records. ), and Immunizations (rocird uploaded )      HPI:    HLD - not on medical therapy  Hypothyroidism - on synthroid 88mcg  Exercise induced asthma - on albuterol PNR  Seasonal allergies - on zyrtec  Anxiety and depression - on wellubtrin and effexor; follows with Oswaldo Zarco at [facility] care  She has a hx of hospitalization 2005 for SI; had 2 weeks of OP therapy and then started these medications and she is reluctant about discontinuing them; Cannot do effexor xr due to hx of gastric sleeve   S/p Gastric sleeve 2016 at [facility] - weight has been mostly steady; did gain 10lbs from covid; has nutritionist plan; not consistent with what she eats; 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  ZIGGY resolved after gastric sleeve  Elevated hg - repeat was normal    Vitals:   /78   Pulse 98   Temp 97.2 °F (36.2 °C)   Resp 17   Ht 5' 4\" (1.626 m)   Wt 172 lb 6.4 oz (78.2 kg)   LMP 08/27/2021   SpO2 99%   Breastfeeding No   BMI 29.59 kg/m²   Wt Readings from Last 3 Encounters:   09/01/21 172 lb 6.4 oz (78.2 kg)   04/21/21 179 lb (81.2 kg)   05/25/19 170 lb (77.1 kg)       PE:  Constitutional - alert, well appearing, and in no distress  Eyes - extraocular eye movements intact, left eye normal, right eye normal, no conjunctivitis noted  Neck - symmetric, no obvious masses noted  Respiratory- clear to auscultation, no wheezes, rales or rhonchi, symmetric air entry; no increased work of breathing  Cardiovascular - normal rate, regular rhythm, normal S1, S2, no murmurs, rubs, clicks or gallops  Extremities - no edema noted  Abdomen - soft, nontender, nondistended  Skin - normal coloration and turgor, no rashes, no suspicious skin lesions noted  Neurological - no obvious CN deficits or focal neurological deficits  Psychiatric - alert, oriented; normal mood, behavior, speech, dress    A / P:     Diagnosis Orders   1. Hyperlipidemia, unspecified hyperlipidemia type     2. Acquired hypothyroidism  TSH without Reflex   3. Exercise-induced asthma     4. Recurrent major depressive disorder, in full remission ([facility] [address]     5. S/P laparoscopic sleeve gastrectomy     6. Need for vaccination  Hep B Vaccine Adult (ENGERIX-B)       q 6 month TSH  Complete Hep B series  F/u with [facility] to determine routine f/u after gastric sleeve  Continue to work on diet and exercise  Establish with ob/gyn; I can do pap if she decides not to establish with gyn      RTO: Return in about 4 months (around 1/1/2022) for routine f/u.         An electronic signature was used to authenticate this note.  ---- Aide Dos Santos MD on 9/1/2021 at 12:26 PM

## 2021-12-15 ENCOUNTER — OFFICE VISIT (OUTPATIENT)
Dept: OBGYN | Age: 36
End: 2021-12-15
Payer: COMMERCIAL

## 2021-12-15 VITALS
BODY MASS INDEX: 31.07 KG/M2 | HEART RATE: 103 BPM | DIASTOLIC BLOOD PRESSURE: 69 MMHG | SYSTOLIC BLOOD PRESSURE: 112 MMHG | HEIGHT: 64 IN | WEIGHT: 182 LBS

## 2021-12-15 DIAGNOSIS — Z01.419 WOMEN'S ANNUAL ROUTINE GYNECOLOGICAL EXAMINATION: Primary | ICD-10-CM

## 2021-12-15 DIAGNOSIS — Z12.4 SCREENING FOR CERVICAL CANCER: ICD-10-CM

## 2021-12-15 PROCEDURE — 99203 OFFICE O/P NEW LOW 30 MIN: CPT | Performed by: OBSTETRICS & GYNECOLOGY

## 2021-12-15 PROCEDURE — G8484 FLU IMMUNIZE NO ADMIN: HCPCS | Performed by: OBSTETRICS & GYNECOLOGY

## 2021-12-15 PROCEDURE — 99385 PREV VISIT NEW AGE 18-39: CPT | Performed by: OBSTETRICS & GYNECOLOGY

## 2021-12-15 NOTE — PROGRESS NOTES
New patient alert and pleasant with no complaints  Here today for annual GYN exam  Pelvic exam, pap smear obtained, labeled  and hand delivered to lab. Discharge instructions have been discussed with the patient. Patient advised to call our office with any questions or concerns. Voiced understanding.

## 2021-12-15 NOTE — PROGRESS NOTES
Jones Cruz     Patient presents for annual exam. No complaints. Patient has an IUD in place. She is unsure of which she has. States it was placed January 4, 2021. Thinks it may be Sallyann Holy which would last for 5 years.      Past Medical History:   Diagnosis Date    Allergic rhinitis     Anxiety     Depression     Hypothyroidism     Obesity     Sleep apnea         Past Surgical History:   Procedure Laterality Date    BARIATRIC SURGERY  06/14/2016    gastric sleeve    FOOT SURGERY  12/10/2015    right foot        Family History   Problem Relation Age of Onset    Depression Mother         ANXIETY, FOOT TROUBLES    Thyroid Disease Mother         hypo    Depression Father     High Blood Pressure Father     Bipolar Disorder Sister         LAP BAND FOR OBESITY    Breast Cancer Paternal Grandmother         possibly in 42's    Heart Attack Paternal Grandfather     No Known Problems Sister           Current Outpatient Medications:     albuterol sulfate  (90 Base) MCG/ACT inhaler, Inhale 2 puffs into the lungs every 6 hours as needed for Wheezing, Disp: 3 Inhaler, Rfl: 3    levothyroxine (SYNTHROID) 88 MCG tablet, Take 1 tablet by mouth Daily, Disp: 90 tablet, Rfl: 3    Cyanocobalamin (VITAMIN B-12) 1000 MCG SUBL, Place 1 tablet under the tongue every other day, Disp: , Rfl:     cetirizine (ZYRTEC) 10 MG tablet, Take 10 mg by mouth daily , Disp: , Rfl:     buPROPion (WELLBUTRIN) 75 MG tablet, Take 75 mg by mouth 2 times daily , Disp: , Rfl:     venlafaxine (EFFEXOR) 100 MG tablet, Take 100 mg by mouth 2 times daily , Disp: , Rfl:     venlafaxine (EFFEXOR) 50 MG tablet, Take 50 mg by mouth 2 times daily , Disp: , Rfl:     Biotin 5000 MCG TABS, Take by mouth, Disp: , Rfl:     Calcium Carb-Cholecalciferol (CALCIUM 500 +D) 500-400 MG-UNIT TABS, Take by mouth, Disp: , Rfl:     ferrous sulfate 28 MG TABS, Take by mouth, Disp: , Rfl:     Pediatric Multivit-Minerals-C (FLINTSTONES COMPLETE PO), Take by mouth, Disp: , Rfl:     Cholecalciferol (VITAMIN D3) 1000 UNITS TABS, Take by mouth, Disp: , Rfl:      Allergies   Allergen Reactions    Adhesive Tape Rash        Social History     Tobacco History     Smoking Status  Never Smoker    Smokeless Tobacco Use  Never Used          Alcohol History     Alcohol Use Status  Yes Comment  rarely          Drug Use     Drug Use Status  Never          Sexual Activity     Sexually Active  Yes Partners  Male Birth Control/Protection  I.U.D. Vitals:    12/15/21 1433   BP: 112/69   Pulse: 103        Physical Exam:  General: pleasant, alert     Breasts: breasts appear normal, no suspicious masses, no skin or nipple changes or axillary nodes. Pelvic exam: normal external genitalia, vulva, vagina, cervix, uterus and adnexa. No uterine or adnexal masses or tenderness. Carley Gordondelmis was seen today for gynecologic exam.    Diagnoses and all orders for this visit:    Women's annual routine gynecological examination    Screening for cervical cancer  -     PAP SMEAR    Advised to call with questions or concerns. Return in about 1 year (around 12/15/2022) for Annual, or as needed.      Alicia Ortiz MD

## 2022-01-19 ENCOUNTER — OFFICE VISIT (OUTPATIENT)
Dept: FAMILY MEDICINE CLINIC | Age: 37
End: 2022-01-19
Payer: COMMERCIAL

## 2022-01-19 VITALS
HEART RATE: 108 BPM | SYSTOLIC BLOOD PRESSURE: 112 MMHG | WEIGHT: 182.8 LBS | HEIGHT: 64 IN | DIASTOLIC BLOOD PRESSURE: 70 MMHG | BODY MASS INDEX: 31.21 KG/M2 | TEMPERATURE: 97.8 F | OXYGEN SATURATION: 99 %

## 2022-01-19 DIAGNOSIS — Z23 NEED FOR VACCINATION: ICD-10-CM

## 2022-01-19 DIAGNOSIS — E66.9 OBESITY (BMI 30-39.9): ICD-10-CM

## 2022-01-19 DIAGNOSIS — F33.42 RECURRENT MAJOR DEPRESSIVE DISORDER, IN FULL REMISSION (HCC): ICD-10-CM

## 2022-01-19 DIAGNOSIS — E03.9 ACQUIRED HYPOTHYROIDISM: ICD-10-CM

## 2022-01-19 DIAGNOSIS — J45.990 EXERCISE-INDUCED ASTHMA: ICD-10-CM

## 2022-01-19 DIAGNOSIS — E78.5 HYPERLIPIDEMIA, UNSPECIFIED HYPERLIPIDEMIA TYPE: Primary | ICD-10-CM

## 2022-01-19 DIAGNOSIS — Z98.84 S/P LAPAROSCOPIC SLEEVE GASTRECTOMY: ICD-10-CM

## 2022-01-19 DIAGNOSIS — F41.9 ANXIETY: ICD-10-CM

## 2022-01-19 PROCEDURE — 1036F TOBACCO NON-USER: CPT | Performed by: FAMILY MEDICINE

## 2022-01-19 PROCEDURE — 90472 IMMUNIZATION ADMIN EACH ADD: CPT | Performed by: FAMILY MEDICINE

## 2022-01-19 PROCEDURE — 90471 IMMUNIZATION ADMIN: CPT | Performed by: FAMILY MEDICINE

## 2022-01-19 PROCEDURE — G8484 FLU IMMUNIZE NO ADMIN: HCPCS | Performed by: FAMILY MEDICINE

## 2022-01-19 PROCEDURE — G8417 CALC BMI ABV UP PARAM F/U: HCPCS | Performed by: FAMILY MEDICINE

## 2022-01-19 PROCEDURE — G8427 DOCREV CUR MEDS BY ELIG CLIN: HCPCS | Performed by: FAMILY MEDICINE

## 2022-01-19 PROCEDURE — 99214 OFFICE O/P EST MOD 30 MIN: CPT | Performed by: FAMILY MEDICINE

## 2022-01-19 PROCEDURE — 90746 HEPB VACCINE 3 DOSE ADULT IM: CPT | Performed by: FAMILY MEDICINE

## 2022-01-19 PROCEDURE — 90732 PPSV23 VACC 2 YRS+ SUBQ/IM: CPT | Performed by: FAMILY MEDICINE

## 2022-01-19 RX ORDER — LEVOTHYROXINE SODIUM 88 UG/1
88 TABLET ORAL DAILY
Qty: 90 TABLET | Refills: 3 | Status: SHIPPED | OUTPATIENT
Start: 2022-01-19

## 2022-01-19 RX ORDER — ALBUTEROL SULFATE 90 UG/1
2 AEROSOL, METERED RESPIRATORY (INHALATION) EVERY 6 HOURS PRN
Qty: 3 EACH | Refills: 3 | Status: SHIPPED | OUTPATIENT
Start: 2022-01-19

## 2022-01-19 NOTE — PROGRESS NOTES
CC: Srinivas Andrew is a 29 y.o. yo female is here for evaluation evaluation for the following chronic medical concerns: Hyperlipidemia and Hypothyroidism      HPI:    HLD - not on medical therapy  Hypothyroidism - on synthroid 88mcg  Exercise induced asthma - on albuterol PNR  Seasonal allergies - on zyrtec  Anxiety and depression - hx of hospitalization 2005 for SI; currently controlled on wellubtrin and effexor (no xl due to hx of gastric sleeve); [facility]; not in therapy at this time  S/p Gastric sleeve 2016 - follows with [facility]; maintaining weight; she is a triathlete  ZIGGY resolved after gastric sleeve    Vitals:   /70   Pulse 108   Temp 97.8 °F (36.6 °C) (Temporal)   Ht 5' 4\" (1.626 m)   Wt 182 lb 12.8 oz (82.9 kg)   LMP 01/03/2022 (Exact Date)   SpO2 99%   BMI 31.38 kg/m²   Wt Readings from Last 3 Encounters:   01/19/22 182 lb 12.8 oz (82.9 kg)   12/15/21 182 lb (82.6 kg)   09/01/21 172 lb 6.4 oz (78.2 kg)       PE:  Constitutional - alert, well appearing, and in no distress  Eyes - extraocular eye movements intact, left eye normal, right eye normal, no conjunctivitis noted  Neck - symmetric, no obvious masses noted  Respiratory- clear to auscultation, no wheezes, rales or rhonchi, symmetric air entry; no increased work of breathing  Cardiovascular - normal rate, regular rhythm, normal S1, S2, no murmurs, rubs, clicks or gallops  Extremities - no edema noted  Abdomen - soft, nontender, nondistended  Skin - normal coloration and turgor, no rashes, no suspicious skin lesions noted      A / P:     Diagnosis Orders   1. Hyperlipidemia, unspecified hyperlipidemia type     2. Recurrent major depressive disorder, in full remission ([address]     3. Anxiety     4. Acquired hypothyroidism  levothyroxine (SYNTHROID) 88 MCG tablet   5. Exercise-induced asthma  albuterol sulfate  (90 Base) MCG/ACT inhaler   6. S/P laparoscopic sleeve gastrectomy     7.  Need for vaccination  Hep B Vaccine Adult (ENGERIX-B)    Pneumococcal polysaccharide vaccine 23-valent greater than or equal to 3yo subcutaneous/IM   8. Obesity (BMI 30-39.9)         q 6 month TSH  Continue to work on diet and exercise      RTO: Return in about 6 months (around 7/19/2022).         An electronic signature was used to authenticate this note.  ---- Avis Brown MD on 1/19/2022 at 12:20 PM

## 2022-05-12 DIAGNOSIS — E03.9 ACQUIRED HYPOTHYROIDISM: ICD-10-CM

## 2022-05-12 LAB — TSH SERPL DL<=0.05 MIU/L-ACNC: 2.06 UIU/ML (ref 0.27–4.2)

## 2022-05-19 ENCOUNTER — OFFICE VISIT (OUTPATIENT)
Dept: FAMILY MEDICINE CLINIC | Age: 37
End: 2022-05-19
Payer: COMMERCIAL

## 2022-05-19 VITALS
TEMPERATURE: 98.6 F | DIASTOLIC BLOOD PRESSURE: 84 MMHG | BODY MASS INDEX: 33.03 KG/M2 | WEIGHT: 192.4 LBS | SYSTOLIC BLOOD PRESSURE: 118 MMHG | OXYGEN SATURATION: 98 % | HEART RATE: 96 BPM

## 2022-05-19 DIAGNOSIS — J45.990 EXERCISE-INDUCED ASTHMA: ICD-10-CM

## 2022-05-19 DIAGNOSIS — Z98.84 S/P LAPAROSCOPIC SLEEVE GASTRECTOMY: ICD-10-CM

## 2022-05-19 DIAGNOSIS — F41.9 ANXIETY: ICD-10-CM

## 2022-05-19 DIAGNOSIS — F33.42 RECURRENT MAJOR DEPRESSIVE DISORDER, IN FULL REMISSION (HCC): ICD-10-CM

## 2022-05-19 DIAGNOSIS — E78.5 HYPERLIPIDEMIA, UNSPECIFIED HYPERLIPIDEMIA TYPE: ICD-10-CM

## 2022-05-19 DIAGNOSIS — E03.9 ACQUIRED HYPOTHYROIDISM: ICD-10-CM

## 2022-05-19 DIAGNOSIS — M46.1 SACROILIITIS (HCC): Primary | ICD-10-CM

## 2022-05-19 PROCEDURE — G8417 CALC BMI ABV UP PARAM F/U: HCPCS | Performed by: FAMILY MEDICINE

## 2022-05-19 PROCEDURE — 1036F TOBACCO NON-USER: CPT | Performed by: FAMILY MEDICINE

## 2022-05-19 PROCEDURE — 99214 OFFICE O/P EST MOD 30 MIN: CPT | Performed by: FAMILY MEDICINE

## 2022-05-19 PROCEDURE — G8427 DOCREV CUR MEDS BY ELIG CLIN: HCPCS | Performed by: FAMILY MEDICINE

## 2022-05-19 RX ORDER — NAPROXEN 500 MG/1
500 TABLET ORAL 2 TIMES DAILY WITH MEALS
Qty: 20 TABLET | Refills: 0 | Status: SHIPPED | OUTPATIENT
Start: 2022-05-19

## 2022-05-19 RX ORDER — CYCLOBENZAPRINE HCL 5 MG
5 TABLET ORAL NIGHTLY PRN
Qty: 10 TABLET | Refills: 0 | Status: SHIPPED | OUTPATIENT
Start: 2022-05-19 | End: 2022-05-29

## 2022-05-19 SDOH — ECONOMIC STABILITY: FOOD INSECURITY: WITHIN THE PAST 12 MONTHS, THE FOOD YOU BOUGHT JUST DIDN'T LAST AND YOU DIDN'T HAVE MONEY TO GET MORE.: NEVER TRUE

## 2022-05-19 SDOH — ECONOMIC STABILITY: FOOD INSECURITY: WITHIN THE PAST 12 MONTHS, YOU WORRIED THAT YOUR FOOD WOULD RUN OUT BEFORE YOU GOT MONEY TO BUY MORE.: NEVER TRUE

## 2022-05-19 ASSESSMENT — PATIENT HEALTH QUESTIONNAIRE - PHQ9
SUM OF ALL RESPONSES TO PHQ QUESTIONS 1-9: 2
SUM OF ALL RESPONSES TO PHQ QUESTIONS 1-9: 2
9. THOUGHTS THAT YOU WOULD BE BETTER OFF DEAD, OR OF HURTING YOURSELF: 0
1. LITTLE INTEREST OR PLEASURE IN DOING THINGS: 0
10. IF YOU CHECKED OFF ANY PROBLEMS, HOW DIFFICULT HAVE THESE PROBLEMS MADE IT FOR YOU TO DO YOUR WORK, TAKE CARE OF THINGS AT HOME, OR GET ALONG WITH OTHER PEOPLE: 0
4. FEELING TIRED OR HAVING LITTLE ENERGY: 0
SUM OF ALL RESPONSES TO PHQ9 QUESTIONS 1 & 2: 0
2. FEELING DOWN, DEPRESSED OR HOPELESS: 0
SUM OF ALL RESPONSES TO PHQ QUESTIONS 1-9: 2
3. TROUBLE FALLING OR STAYING ASLEEP: 1
7. TROUBLE CONCENTRATING ON THINGS, SUCH AS READING THE NEWSPAPER OR WATCHING TELEVISION: 0
6. FEELING BAD ABOUT YOURSELF - OR THAT YOU ARE A FAILURE OR HAVE LET YOURSELF OR YOUR FAMILY DOWN: 0
5. POOR APPETITE OR OVEREATING: 1
8. MOVING OR SPEAKING SO SLOWLY THAT OTHER PEOPLE COULD HAVE NOTICED. OR THE OPPOSITE, BEING SO FIGETY OR RESTLESS THAT YOU HAVE BEEN MOVING AROUND A LOT MORE THAN USUAL: 0
SUM OF ALL RESPONSES TO PHQ QUESTIONS 1-9: 2

## 2022-05-19 ASSESSMENT — SOCIAL DETERMINANTS OF HEALTH (SDOH): HOW HARD IS IT FOR YOU TO PAY FOR THE VERY BASICS LIKE FOOD, HOUSING, MEDICAL CARE, AND HEATING?: NOT HARD AT ALL

## 2022-05-19 NOTE — PROGRESS NOTES
CC: Heidi Pantoja is a 40 y.o. yo female is here for evaluation evaluation for the following chronic medical concerns: Back Pain (since doing yard work on Tuesday, pt still doing PT exercise ) and Results (thyroid BW)      HPI:    Lifted dog (65lbs) got sciatic nerve acting up; started PT 4/29 with improvement; then this last week did yard work which exacerbated the pain; now her sciatic is improved but her low back pain is wose    HLD - not on medical therapy  Hypothyroidism - on synthroid 88mcg  Exercise induced asthma - on albuterol PNR  Seasonal allergies - on zyrtec  Anxiety and depression - hx of hospitalization 2005 for SI; currently controlled on wellubtrin and effexor (no xl due to hx of gastric sleeve); [facility]  S/p Gastric sleeve 2016 - follows with [facility]; maintaining weight; she is a triathlete  ZIGGY resolved after gastric sleeve    Vitals:   /84 (Site: Right Upper Arm, Position: Sitting, Cuff Size: Medium Adult)   Pulse 96   Temp 98.6 °F (37 °C)   Wt 192 lb 6.4 oz (87.3 kg)   LMP 04/19/2022   SpO2 98%   BMI 33.03 kg/m²   Wt Readings from Last 3 Encounters:   05/19/22 192 lb 6.4 oz (87.3 kg)   01/19/22 182 lb 12.8 oz (82.9 kg)   12/15/21 182 lb (82.6 kg)       PE:  Constitutional - alert, well appearing, and in no distress  Eyes - extraocular eye movements intact, left eye normal, right eye normal, no conjunctivitis noted  Neck - symmetric, no obvious masses noted  Respiratory- clear to auscultation, no wheezes, rales or rhonchi, symmetric air entry; no increased work of breathing  Cardiovascular - normal rate, regular rhythm, normal S1, S2, no murmurs, rubs, clicks or gallops  Extremities - no edema noted  Abdomen - soft, nontender, nondistended  msk- + straight leg b/l; R SI tenderness  Skin - normal coloration and turgor, no rashes, no suspicious skin lesions noted      A / P:     Diagnosis Orders   1.  Sacroiliitis (HCC)  naproxen (NAPROSYN) 500 MG tablet cyclobenzaprine (FLEXERIL) 5 MG tablet   2. Hyperlipidemia, unspecified hyperlipidemia type     3. Anxiety     4. Recurrent major depressive disorder, in full remission ([facility] [address]     5. Exercise-induced asthma     6. Acquired hypothyroidism     7. S/P laparoscopic sleeve gastrectomy       Treat as SI; naproxen and muscle relaxer  She will call if symptoms worsen at which point we can obtain xrays and or refer to pmr  q 6 month TSH  Continue to work on diet and exercise      RTO: Return in about 6 months (around 11/19/2022) for chronic disease / routine f/u.         An electronic signature was used to authenticate this note.  ---- Hiwot Lopez MD on 5/19/2022 at 4:55 PM

## 2022-11-17 ENCOUNTER — OFFICE VISIT (OUTPATIENT)
Dept: FAMILY MEDICINE CLINIC | Age: 37
End: 2022-11-17
Payer: COMMERCIAL

## 2022-11-17 VITALS
TEMPERATURE: 97.4 F | SYSTOLIC BLOOD PRESSURE: 104 MMHG | HEART RATE: 96 BPM | WEIGHT: 192.8 LBS | BODY MASS INDEX: 33.09 KG/M2 | DIASTOLIC BLOOD PRESSURE: 72 MMHG | OXYGEN SATURATION: 100 %

## 2022-11-17 DIAGNOSIS — Z00.00 ENCOUNTER FOR PREVENTIVE CARE: ICD-10-CM

## 2022-11-17 DIAGNOSIS — E03.9 ACQUIRED HYPOTHYROIDISM: Primary | ICD-10-CM

## 2022-11-17 DIAGNOSIS — E03.9 ACQUIRED HYPOTHYROIDISM: ICD-10-CM

## 2022-11-17 DIAGNOSIS — E55.9 VITAMIN D DEFICIENCY, UNSPECIFIED: ICD-10-CM

## 2022-11-17 LAB
BACTERIA: ABNORMAL /HPF
BILIRUBIN URINE: NEGATIVE
BLOOD, URINE: NEGATIVE
CLARITY: CLEAR
COLOR: YELLOW
CRYSTALS, UA: ABNORMAL /HPF
EPITHELIAL CELLS, UA: ABNORMAL /HPF
GLUCOSE URINE: NEGATIVE MG/DL
KETONES, URINE: NEGATIVE MG/DL
LEUKOCYTE ESTERASE, URINE: ABNORMAL
NITRITE, URINE: NEGATIVE
PH UA: 6 (ref 5–9)
PROTEIN UA: NEGATIVE MG/DL
RBC UA: ABNORMAL /HPF (ref 0–2)
SPECIFIC GRAVITY UA: >=1.03 (ref 1–1.03)
TSH SERPL DL<=0.05 MIU/L-ACNC: 4.39 UIU/ML (ref 0.27–4.2)
UROBILINOGEN, URINE: 0.2 E.U./DL
WBC UA: ABNORMAL /HPF (ref 0–5)

## 2022-11-17 PROCEDURE — G8417 CALC BMI ABV UP PARAM F/U: HCPCS | Performed by: FAMILY MEDICINE

## 2022-11-17 PROCEDURE — G8484 FLU IMMUNIZE NO ADMIN: HCPCS | Performed by: FAMILY MEDICINE

## 2022-11-17 PROCEDURE — 99214 OFFICE O/P EST MOD 30 MIN: CPT | Performed by: FAMILY MEDICINE

## 2022-11-17 PROCEDURE — G8427 DOCREV CUR MEDS BY ELIG CLIN: HCPCS | Performed by: FAMILY MEDICINE

## 2022-11-17 PROCEDURE — 1036F TOBACCO NON-USER: CPT | Performed by: FAMILY MEDICINE

## 2022-11-17 RX ORDER — LEVOTHYROXINE SODIUM 88 UG/1
88 TABLET ORAL DAILY
Qty: 90 TABLET | Refills: 3 | Status: SHIPPED
Start: 2022-11-17 | End: 2022-11-17 | Stop reason: SDUPTHER

## 2022-11-17 RX ORDER — LEVOTHYROXINE SODIUM 0.1 MG/1
100 TABLET ORAL DAILY
Qty: 90 TABLET | Refills: 1 | Status: SHIPPED | OUTPATIENT
Start: 2022-11-17

## 2022-11-17 ASSESSMENT — PATIENT HEALTH QUESTIONNAIRE - PHQ9
SUM OF ALL RESPONSES TO PHQ9 QUESTIONS 1 & 2: 0
1. LITTLE INTEREST OR PLEASURE IN DOING THINGS: 0
3. TROUBLE FALLING OR STAYING ASLEEP: 1
4. FEELING TIRED OR HAVING LITTLE ENERGY: 1
SUM OF ALL RESPONSES TO PHQ QUESTIONS 1-9: 3
7. TROUBLE CONCENTRATING ON THINGS, SUCH AS READING THE NEWSPAPER OR WATCHING TELEVISION: 0
2. FEELING DOWN, DEPRESSED OR HOPELESS: 0
9. THOUGHTS THAT YOU WOULD BE BETTER OFF DEAD, OR OF HURTING YOURSELF: 0
SUM OF ALL RESPONSES TO PHQ QUESTIONS 1-9: 3
10. IF YOU CHECKED OFF ANY PROBLEMS, HOW DIFFICULT HAVE THESE PROBLEMS MADE IT FOR YOU TO DO YOUR WORK, TAKE CARE OF THINGS AT HOME, OR GET ALONG WITH OTHER PEOPLE: 0
SUM OF ALL RESPONSES TO PHQ QUESTIONS 1-9: 3
6. FEELING BAD ABOUT YOURSELF - OR THAT YOU ARE A FAILURE OR HAVE LET YOURSELF OR YOUR FAMILY DOWN: 0
5. POOR APPETITE OR OVEREATING: 1
8. MOVING OR SPEAKING SO SLOWLY THAT OTHER PEOPLE COULD HAVE NOTICED. OR THE OPPOSITE, BEING SO FIGETY OR RESTLESS THAT YOU HAVE BEEN MOVING AROUND A LOT MORE THAN USUAL: 0
SUM OF ALL RESPONSES TO PHQ QUESTIONS 1-9: 3

## 2022-11-17 NOTE — PROGRESS NOTES
Urinalysis      3. Vitamin D deficiency, unspecified  Vitamin D 25 Hydroxy          Increase synthroid to 100mcg  Labs as ordered  Continue to work on diet and exercise      RTO: Return in about 4 months (around 3/17/2023) for chronic disease / routine f/u.         An electronic signature was used to authenticate this note.  ---- Allison Monsalve MD on 11/17/2022 at 4:53 PM

## 2022-12-09 ENCOUNTER — TELEPHONE (OUTPATIENT)
Dept: FAMILY MEDICINE CLINIC | Age: 37
End: 2022-12-09

## 2022-12-09 NOTE — TELEPHONE ENCOUNTER
Called pt back and scheduled an appointment with Martine Avelar on Monday 12/15/22. Told her to keep ear clean till she comes in to get it examined.

## 2022-12-09 NOTE — TELEPHONE ENCOUNTER
Pt called stating she took a flight to [place] on 12/8/22. She said she woke up this morning with blood on her pillow. She thinks she busted an ear drum on the right side on the flight. She wants to know if she can be seen when she comes back to town. She will be arriving Monday. Painful on the flight and popped. Hasn't had problems since. No headaches or ear aches. Just the blood this morning on her pillow.      AE:[phone]

## 2022-12-15 ENCOUNTER — OFFICE VISIT (OUTPATIENT)
Dept: FAMILY MEDICINE CLINIC | Age: 37
End: 2022-12-15
Payer: COMMERCIAL

## 2022-12-15 VITALS
OXYGEN SATURATION: 100 % | HEART RATE: 84 BPM | WEIGHT: 192.6 LBS | TEMPERATURE: 97.5 F | SYSTOLIC BLOOD PRESSURE: 124 MMHG | BODY MASS INDEX: 33.06 KG/M2 | DIASTOLIC BLOOD PRESSURE: 82 MMHG

## 2022-12-15 DIAGNOSIS — H66.001 ACUTE SUPPURATIVE OTITIS MEDIA OF RIGHT EAR WITHOUT SPONTANEOUS RUPTURE OF TYMPANIC MEMBRANE, RECURRENCE NOT SPECIFIED: Primary | ICD-10-CM

## 2022-12-15 PROCEDURE — G8427 DOCREV CUR MEDS BY ELIG CLIN: HCPCS | Performed by: FAMILY MEDICINE

## 2022-12-15 PROCEDURE — 99214 OFFICE O/P EST MOD 30 MIN: CPT | Performed by: FAMILY MEDICINE

## 2022-12-15 PROCEDURE — 1036F TOBACCO NON-USER: CPT | Performed by: FAMILY MEDICINE

## 2022-12-15 PROCEDURE — G8417 CALC BMI ABV UP PARAM F/U: HCPCS | Performed by: FAMILY MEDICINE

## 2022-12-15 PROCEDURE — G8484 FLU IMMUNIZE NO ADMIN: HCPCS | Performed by: FAMILY MEDICINE

## 2022-12-15 RX ORDER — AMOXICILLIN AND CLAVULANATE POTASSIUM 875; 125 MG/1; MG/1
1 TABLET, FILM COATED ORAL 2 TIMES DAILY WITH MEALS
Qty: 14 TABLET | Refills: 0 | Status: SHIPPED | OUTPATIENT
Start: 2022-12-15 | End: 2022-12-22

## 2022-12-15 RX ORDER — CIPROFLOXACIN AND DEXAMETHASONE 3; 1 MG/ML; MG/ML
2 SUSPENSION/ DROPS AURICULAR (OTIC) 2 TIMES DAILY
Qty: 1 EACH | Refills: 0 | Status: SHIPPED
Start: 2022-12-15 | End: 2022-12-15

## 2022-12-15 ASSESSMENT — PATIENT HEALTH QUESTIONNAIRE - PHQ9
SUM OF ALL RESPONSES TO PHQ QUESTIONS 1-9: 2
7. TROUBLE CONCENTRATING ON THINGS, SUCH AS READING THE NEWSPAPER OR WATCHING TELEVISION: 0
10. IF YOU CHECKED OFF ANY PROBLEMS, HOW DIFFICULT HAVE THESE PROBLEMS MADE IT FOR YOU TO DO YOUR WORK, TAKE CARE OF THINGS AT HOME, OR GET ALONG WITH OTHER PEOPLE: 0
4. FEELING TIRED OR HAVING LITTLE ENERGY: 1
SUM OF ALL RESPONSES TO PHQ9 QUESTIONS 1 & 2: 0
8. MOVING OR SPEAKING SO SLOWLY THAT OTHER PEOPLE COULD HAVE NOTICED. OR THE OPPOSITE, BEING SO FIGETY OR RESTLESS THAT YOU HAVE BEEN MOVING AROUND A LOT MORE THAN USUAL: 0
SUM OF ALL RESPONSES TO PHQ QUESTIONS 1-9: 2
SUM OF ALL RESPONSES TO PHQ QUESTIONS 1-9: 2
6. FEELING BAD ABOUT YOURSELF - OR THAT YOU ARE A FAILURE OR HAVE LET YOURSELF OR YOUR FAMILY DOWN: 0
2. FEELING DOWN, DEPRESSED OR HOPELESS: 0
9. THOUGHTS THAT YOU WOULD BE BETTER OFF DEAD, OR OF HURTING YOURSELF: 0
SUM OF ALL RESPONSES TO PHQ QUESTIONS 1-9: 2
3. TROUBLE FALLING OR STAYING ASLEEP: 1
1. LITTLE INTEREST OR PLEASURE IN DOING THINGS: 0
5. POOR APPETITE OR OVEREATING: 0

## 2022-12-15 NOTE — PROGRESS NOTES
CC: Mirian Zepeda is a 40 y.o. yo female is here for evaluation evaluation for the following acute medical concerns: Ear Problem (Right-sided; noticed a popping sensation while flying recently; noticed blood on her pillow the morning after)      HPI:      Traveled on plane; had sudden severe pain during flight with then had complete and spontaneous resolution of pain; the next morning had blood on the R pillow; since then she has not had any new symptoms; she was ill a couple weeks prior to travel with a viral illness      Vitals:   /82 (Site: Right Upper Arm, Position: Sitting, Cuff Size: Large Adult)   Pulse 84   Temp 97.5 °F (36.4 °C) (Temporal)   Wt 192 lb 9.6 oz (87.4 kg)   SpO2 100%   BMI 33.06 kg/m²   Wt Readings from Last 3 Encounters:   12/15/22 192 lb 9.6 oz (87.4 kg)   11/17/22 192 lb 12.8 oz (87.5 kg)   05/19/22 192 lb 6.4 oz (87.3 kg)       PE:  Constitutional - alert, well appearing, and in no distress  Eyes - extraocular eye movements intact, left eye normal, right eye normal, no conjunctivitis noted  Ears: L TM normal; R ear completely opacified and canal with hyperpigmented blister-like growth in ext ear canal  Neck - symmetric, no obvious masses noted  Respiratory- no increased work of breathing      A / P:     Diagnosis Orders   1. Acute suppurative otitis media of right ear without spontaneous rupture of tympanic membrane, recurrence not specified  amoxicillin-clavulanate (AUGMENTIN) 875-125 MG per tablet    DISCONTINUED: ciprofloxacin-dexamethasone (CIPRODEX) 0.3-0.1 % otic suspension          RTO: Return in about 3 weeks (around 1/5/2023) for ear check.       An electronic signature was used to authenticate this note.  ---- Ozzie Jacobs MD on 12/15/2022 at 9:11 AM

## 2023-01-03 ENCOUNTER — OFFICE VISIT (OUTPATIENT)
Dept: FAMILY MEDICINE CLINIC | Age: 38
End: 2023-01-03
Payer: COMMERCIAL

## 2023-01-03 VITALS
WEIGHT: 196.4 LBS | DIASTOLIC BLOOD PRESSURE: 68 MMHG | SYSTOLIC BLOOD PRESSURE: 114 MMHG | OXYGEN SATURATION: 100 % | BODY MASS INDEX: 33.71 KG/M2 | TEMPERATURE: 97.7 F | HEART RATE: 94 BPM

## 2023-01-03 DIAGNOSIS — H66.001 ACUTE SUPPURATIVE OTITIS MEDIA OF RIGHT EAR WITHOUT SPONTANEOUS RUPTURE OF TYMPANIC MEMBRANE, RECURRENCE NOT SPECIFIED: Primary | ICD-10-CM

## 2023-01-03 PROCEDURE — G8417 CALC BMI ABV UP PARAM F/U: HCPCS | Performed by: FAMILY MEDICINE

## 2023-01-03 PROCEDURE — 1036F TOBACCO NON-USER: CPT | Performed by: FAMILY MEDICINE

## 2023-01-03 PROCEDURE — 99214 OFFICE O/P EST MOD 30 MIN: CPT | Performed by: FAMILY MEDICINE

## 2023-01-03 PROCEDURE — G8484 FLU IMMUNIZE NO ADMIN: HCPCS | Performed by: FAMILY MEDICINE

## 2023-01-03 PROCEDURE — G8427 DOCREV CUR MEDS BY ELIG CLIN: HCPCS | Performed by: FAMILY MEDICINE

## 2023-01-03 RX ORDER — DOXYCYCLINE HYCLATE 100 MG
100 TABLET ORAL 2 TIMES DAILY
Qty: 20 TABLET | Refills: 0 | Status: SHIPPED | OUTPATIENT
Start: 2023-01-03 | End: 2023-01-13

## 2023-01-03 ASSESSMENT — PATIENT HEALTH QUESTIONNAIRE - PHQ9
6. FEELING BAD ABOUT YOURSELF - OR THAT YOU ARE A FAILURE OR HAVE LET YOURSELF OR YOUR FAMILY DOWN: 0
SUM OF ALL RESPONSES TO PHQ9 QUESTIONS 1 & 2: 0
10. IF YOU CHECKED OFF ANY PROBLEMS, HOW DIFFICULT HAVE THESE PROBLEMS MADE IT FOR YOU TO DO YOUR WORK, TAKE CARE OF THINGS AT HOME, OR GET ALONG WITH OTHER PEOPLE: 0
8. MOVING OR SPEAKING SO SLOWLY THAT OTHER PEOPLE COULD HAVE NOTICED. OR THE OPPOSITE, BEING SO FIGETY OR RESTLESS THAT YOU HAVE BEEN MOVING AROUND A LOT MORE THAN USUAL: 0
3. TROUBLE FALLING OR STAYING ASLEEP: 0
1. LITTLE INTEREST OR PLEASURE IN DOING THINGS: 0
5. POOR APPETITE OR OVEREATING: 1
SUM OF ALL RESPONSES TO PHQ QUESTIONS 1-9: 1
SUM OF ALL RESPONSES TO PHQ QUESTIONS 1-9: 1
4. FEELING TIRED OR HAVING LITTLE ENERGY: 0
9. THOUGHTS THAT YOU WOULD BE BETTER OFF DEAD, OR OF HURTING YOURSELF: 0
SUM OF ALL RESPONSES TO PHQ QUESTIONS 1-9: 1
2. FEELING DOWN, DEPRESSED OR HOPELESS: 0
SUM OF ALL RESPONSES TO PHQ QUESTIONS 1-9: 1
7. TROUBLE CONCENTRATING ON THINGS, SUCH AS READING THE NEWSPAPER OR WATCHING TELEVISION: 0

## 2023-01-03 ASSESSMENT — LIFESTYLE VARIABLES
HOW MANY STANDARD DRINKS CONTAINING ALCOHOL DO YOU HAVE ON A TYPICAL DAY: PATIENT DOES NOT DRINK
HOW OFTEN DO YOU HAVE A DRINK CONTAINING ALCOHOL: NEVER

## 2023-01-03 NOTE — PROGRESS NOTES
CC: Robbie Santamaria is a 40 y.o. yo female is here for evaluation evaluation for the following acute medical concerns: Ear Problem (Right ear recheck)      HPI:      Traveled on plane; had sudden severe pain during flight with then had complete and spontaneous resolution of pain; the next morning had blood on the R pillow; since then she has not had any new symptoms; she was ill a couple weeks prior to travel with a viral illness  Interval hx:  No more ear pain; L ear slightly full sensation; otherwise feels ok      Vitals:   /68 (Site: Right Upper Arm, Position: Sitting, Cuff Size: Medium Adult)   Pulse 94   Temp 97.7 °F (36.5 °C) (Temporal)   Wt 196 lb 6.4 oz (89.1 kg)   SpO2 100%   BMI 33.71 kg/m²   Wt Readings from Last 3 Encounters:   01/03/23 196 lb 6.4 oz (89.1 kg)   12/15/22 192 lb 9.6 oz (87.4 kg)   11/17/22 192 lb 12.8 oz (87.5 kg)       PE:  Constitutional - alert, well appearing, and in no distress  Eyes - extraocular eye movements intact, left eye normal, right eye normal, no conjunctivitis noted  Ears: L TM with some mild purulent / opacity base of TM;  R ear again completely opacified and canal with now old blood blister        A / P:     Diagnosis Orders   1. Acute suppurative otitis media of right ear without spontaneous rupture of tympanic membrane, recurrence not specified  doxycycline hyclate (VIBRA-TABS) 100 MG tablet            RTO: Return in about 2 weeks (around 1/17/2023) for ear check.       An electronic signature was used to authenticate this note.  ---- La Nena Wright MD on 1/3/2023 at 4:27 PM

## 2023-01-17 ENCOUNTER — OFFICE VISIT (OUTPATIENT)
Dept: FAMILY MEDICINE CLINIC | Age: 38
End: 2023-01-17
Payer: COMMERCIAL

## 2023-01-17 VITALS
DIASTOLIC BLOOD PRESSURE: 62 MMHG | BODY MASS INDEX: 33.71 KG/M2 | OXYGEN SATURATION: 100 % | SYSTOLIC BLOOD PRESSURE: 112 MMHG | WEIGHT: 196.4 LBS | HEART RATE: 92 BPM | TEMPERATURE: 97.5 F

## 2023-01-17 DIAGNOSIS — J45.990 EXERCISE-INDUCED ASTHMA: ICD-10-CM

## 2023-01-17 DIAGNOSIS — H66.001 ACUTE SUPPURATIVE OTITIS MEDIA OF RIGHT EAR WITHOUT SPONTANEOUS RUPTURE OF TYMPANIC MEMBRANE, RECURRENCE NOT SPECIFIED: Primary | ICD-10-CM

## 2023-01-17 PROCEDURE — G8417 CALC BMI ABV UP PARAM F/U: HCPCS | Performed by: FAMILY MEDICINE

## 2023-01-17 PROCEDURE — G8427 DOCREV CUR MEDS BY ELIG CLIN: HCPCS | Performed by: FAMILY MEDICINE

## 2023-01-17 PROCEDURE — G8484 FLU IMMUNIZE NO ADMIN: HCPCS | Performed by: FAMILY MEDICINE

## 2023-01-17 PROCEDURE — 1036F TOBACCO NON-USER: CPT | Performed by: FAMILY MEDICINE

## 2023-01-17 PROCEDURE — 99213 OFFICE O/P EST LOW 20 MIN: CPT | Performed by: FAMILY MEDICINE

## 2023-01-17 RX ORDER — ALBUTEROL SULFATE 90 UG/1
2 AEROSOL, METERED RESPIRATORY (INHALATION) EVERY 6 HOURS PRN
Qty: 3 EACH | Refills: 3 | Status: SHIPPED | OUTPATIENT
Start: 2023-01-17

## 2023-01-17 RX ORDER — DOXYCYCLINE HYCLATE 100 MG
100 TABLET ORAL 2 TIMES DAILY
Qty: 14 TABLET | Refills: 0 | Status: SHIPPED | OUTPATIENT
Start: 2023-01-17 | End: 2023-01-24

## 2023-01-17 NOTE — PROGRESS NOTES
CC: Deana Morrissey is a 40 y.o. yo female is here for evaluation evaluation for the following acute medical concerns: Otalgia (2-week check)      HPI:    Ear issue:   Traveled on plane; had sudden severe pain during flight with then had complete and spontaneous resolution of pain; the next morning had blood on the R pillow; since then she has not had any new symptoms; she was ill a couple weeks prior to travel with a viral illness  Interval hx:  No more ear pain; L ear slightly full sensation; otherwise feels ok  Interval hx  Improved symptoms; back to normal; completed doxy 10 days      Vitals:   /62 (Site: Right Upper Arm, Position: Sitting, Cuff Size: Large Adult)   Pulse 92   Temp 97.5 °F (36.4 °C) (Temporal)   Wt 196 lb 6.4 oz (89.1 kg)   SpO2 100%   BMI 33.71 kg/m²   Wt Readings from Last 3 Encounters:   01/17/23 196 lb 6.4 oz (89.1 kg)   01/03/23 196 lb 6.4 oz (89.1 kg)   12/15/22 192 lb 9.6 oz (87.4 kg)       PE:  Constitutional - alert, well appearing, and in no distress  Ears: L now normal;  R ear again opacified but less than previous; again residual bloody cerume v old blood blister        A / P:     Diagnosis Orders   1. Acute suppurative otitis media of right ear without spontaneous rupture of tympanic membrane, recurrence not specified  doxycycline hyclate (VIBRA-TABS) 100 MG tablet      2. Exercise-induced asthma  albuterol sulfate HFA (PROVENTIL;VENTOLIN;PROAIR) 108 (90 Base) MCG/ACT inhaler        Repeat doxy for 7 days  F/u 2-3 weeks   Take flonase (she has this at home)    RTO: Return in about 3 weeks (around 2/7/2023) for chronic disease / routine f/u.       An electronic signature was used to authenticate this note.  ---- Carlo Reilly MD on 1/17/2023 at 9:27 AM

## 2023-02-08 ENCOUNTER — OFFICE VISIT (OUTPATIENT)
Dept: FAMILY MEDICINE CLINIC | Age: 38
End: 2023-02-08
Payer: COMMERCIAL

## 2023-02-08 VITALS
OXYGEN SATURATION: 99 % | HEART RATE: 78 BPM | SYSTOLIC BLOOD PRESSURE: 112 MMHG | TEMPERATURE: 97.3 F | HEIGHT: 64 IN | DIASTOLIC BLOOD PRESSURE: 62 MMHG | WEIGHT: 195.4 LBS | BODY MASS INDEX: 33.36 KG/M2

## 2023-02-08 DIAGNOSIS — Z98.84 S/P LAPAROSCOPIC SLEEVE GASTRECTOMY: ICD-10-CM

## 2023-02-08 DIAGNOSIS — F41.9 ANXIETY: ICD-10-CM

## 2023-02-08 DIAGNOSIS — H61.21 IMPACTED CERUMEN OF RIGHT EAR: ICD-10-CM

## 2023-02-08 DIAGNOSIS — E78.5 HYPERLIPIDEMIA, UNSPECIFIED HYPERLIPIDEMIA TYPE: ICD-10-CM

## 2023-02-08 DIAGNOSIS — F33.42 RECURRENT MAJOR DEPRESSIVE DISORDER, IN FULL REMISSION (HCC): ICD-10-CM

## 2023-02-08 DIAGNOSIS — E03.9 ACQUIRED HYPOTHYROIDISM: ICD-10-CM

## 2023-02-08 DIAGNOSIS — E66.09 CLASS 1 OBESITY DUE TO EXCESS CALORIES WITHOUT SERIOUS COMORBIDITY WITH BODY MASS INDEX (BMI) OF 33.0 TO 33.9 IN ADULT: ICD-10-CM

## 2023-02-08 DIAGNOSIS — H69.83 DYSFUNCTION OF BOTH EUSTACHIAN TUBES: Primary | ICD-10-CM

## 2023-02-08 PROCEDURE — G8427 DOCREV CUR MEDS BY ELIG CLIN: HCPCS | Performed by: FAMILY MEDICINE

## 2023-02-08 PROCEDURE — 99214 OFFICE O/P EST MOD 30 MIN: CPT | Performed by: FAMILY MEDICINE

## 2023-02-08 PROCEDURE — G8484 FLU IMMUNIZE NO ADMIN: HCPCS | Performed by: FAMILY MEDICINE

## 2023-02-08 PROCEDURE — 69210 REMOVE IMPACTED EAR WAX UNI: CPT | Performed by: FAMILY MEDICINE

## 2023-02-08 PROCEDURE — G8417 CALC BMI ABV UP PARAM F/U: HCPCS | Performed by: FAMILY MEDICINE

## 2023-02-08 PROCEDURE — 1036F TOBACCO NON-USER: CPT | Performed by: FAMILY MEDICINE

## 2023-02-08 RX ORDER — AZELASTINE 1 MG/ML
1 SPRAY, METERED NASAL DAILY
Qty: 60 ML | Refills: 1 | Status: SHIPPED | OUTPATIENT
Start: 2023-02-08

## 2023-02-08 RX ORDER — FLUTICASONE PROPIONATE 50 MCG
1 SPRAY, SUSPENSION (ML) NASAL DAILY
COMMUNITY

## 2023-02-08 SDOH — ECONOMIC STABILITY: FOOD INSECURITY: WITHIN THE PAST 12 MONTHS, THE FOOD YOU BOUGHT JUST DIDN'T LAST AND YOU DIDN'T HAVE MONEY TO GET MORE.: NEVER TRUE

## 2023-02-08 SDOH — ECONOMIC STABILITY: HOUSING INSECURITY
IN THE LAST 12 MONTHS, WAS THERE A TIME WHEN YOU DID NOT HAVE A STEADY PLACE TO SLEEP OR SLEPT IN A SHELTER (INCLUDING NOW)?: NO

## 2023-02-08 SDOH — ECONOMIC STABILITY: INCOME INSECURITY: HOW HARD IS IT FOR YOU TO PAY FOR THE VERY BASICS LIKE FOOD, HOUSING, MEDICAL CARE, AND HEATING?: NOT HARD AT ALL

## 2023-02-08 SDOH — ECONOMIC STABILITY: FOOD INSECURITY: WITHIN THE PAST 12 MONTHS, YOU WORRIED THAT YOUR FOOD WOULD RUN OUT BEFORE YOU GOT MONEY TO BUY MORE.: NEVER TRUE

## 2023-02-08 NOTE — PROGRESS NOTES
CC: Mely Potter is a 40 y.o. yo female is here for evaluation evaluation for the following chronic medical concerns: Otalgia (Bilateral; 3-week follow-up)      HPI:    HLD - not on medical therapy  Hypothyroidism - on synthroid 100mcg  Exercise induced asthma - on albuterol PRN  Seasonal allergies - on xyzal  Anxiety and depression - hx of hospitalization 2005 for SI; currently controlled on wellubtrin and effexor (no xl due to hx of gastric sleeve); follows with Scot Hernandez at [facility]; previous attempt to decrease dose of effexor unsuccessful -- same, controlled  S/p Gastric sleeve 2016 - follows with [facility]; maintaining weight;  triathlete; last labs 2021  ZIGGY resolved after gastric sleeve    Recent ear perforation and otitis media, see previous notes  Today with  improved symptoms but rare fullness at times  Still using flonase as able      Vitals:   /62 (Site: Left Upper Arm, Position: Sitting, Cuff Size: Large Adult)   Pulse 78   Temp 97.3 °F (36.3 °C) (Temporal)   Ht 5' 4\" (1.626 m)   Wt 195 lb 6.4 oz (88.6 kg)   SpO2 99%   BMI 33.54 kg/m²   Wt Readings from Last 3 Encounters:   02/08/23 195 lb 6.4 oz (88.6 kg)   01/17/23 196 lb 6.4 oz (89.1 kg)   01/03/23 196 lb 6.4 oz (89.1 kg)       PE:  Constitutional - alert, well appearing, and in no distress  Ears: b/l TM wnl; L with trace opac base of TM; R ear with dried dark colored cerumen v old blood blister which was removed without difficulty  Eyes - extraocular eye movements intact, left eye normal, right eye normal, no conjunctivitis noted  Neck - symmetric, no obvious masses noted  Respiratory- clear to auscultation, no wheezes, rales or rhonchi, symmetric air entry; no increased work of breathing  Cardiovascular - normal rate, regular rhythm, normal S1, S2, no murmurs, rubs, clicks or gallops  Extremities - no edema noted  Abdomen - soft, nontender, nondistended  Skin - normal coloration and turgor, no rashes, no suspicious skin lesions noted      A / P:     Diagnosis Orders   1. Dysfunction of both eustachian tubes  azelastine (ASTELIN) 0.1 % nasal spray      2. Hyperlipidemia, unspecified hyperlipidemia type        3. Recurrent major depressive disorder, in full remission ([facility] [address]        4. Anxiety        5. Acquired hypothyroidism        6. S/P laparoscopic sleeve gastrectomy  Vitamin K    PTH, Intact    Vitamin B12 & Folate    Vitamin B6    Vitamin B1    Vitamin D 25 Hydroxy    Zinc    Ferritin    Iron and TIBC    TSH    Uric Acid    Magnesium    Phosphorus    Comprehensive Metabolic Panel    CBC with Auto Differential    Urinalysis      7. Impacted cerumen of right ear  44549 - [place] REMOVE IMPACTED EAR WAX      8. Class 1 obesity due to excess calories without serious comorbidity with body mass index (BMI) of 33.0 to 33.9 in adult            Alternate flonase, azelastine every other day  Return to office for worsening symptoms  Continue synthroid 100mcg  Labs as ordered  Continue to work on diet and exercise      RTO: Return in about 4 months (around 6/8/2023) for chronic disease / routine f/u.         An electronic signature was used to authenticate this note.  ---- Nicanor Arana MD on 2/8/2023 at 9:21 AM

## 2023-02-21 DIAGNOSIS — E03.9 ACQUIRED HYPOTHYROIDISM: ICD-10-CM

## 2023-02-21 NOTE — TELEPHONE ENCOUNTER
Medication Refill Request    LOV 2/8/2023  NOV 6/13/2023    Lab Results   Component Value Date    CREATININE 0.8 04/21/2021     The patient called requesting a refill.

## 2023-02-22 RX ORDER — LEVOTHYROXINE SODIUM 0.1 MG/1
100 TABLET ORAL DAILY
Qty: 90 TABLET | Refills: 0 | Status: SHIPPED | OUTPATIENT
Start: 2023-02-22

## 2023-03-15 ENCOUNTER — HOSPITAL ENCOUNTER (OUTPATIENT)
Age: 38
Discharge: HOME | End: 2023-03-15
Payer: COMMERCIAL

## 2023-03-15 DIAGNOSIS — Z98.84 S/P LAPAROSCOPIC SLEEVE GASTRECTOMY: ICD-10-CM

## 2023-03-15 LAB
ALBUMIN SERPL-MCNC: 4.2 G/DL (ref 3.5–5.2)
ALP SERPL-CCNC: 93 U/L (ref 35–104)
ALT SERPL-CCNC: 29 U/L (ref 0–32)
ANION GAP SERPL CALCULATED.3IONS-SCNC: 10 MMOL/L (ref 7–16)
AST SERPL-CCNC: 33 U/L (ref 0–31)
BACTERIA URNS QL MICRO: ABNORMAL /HPF
BASOPHILS # BLD: 0.05 E9/L (ref 0–0.2)
BASOPHILS NFR BLD: 0.8 % (ref 0–2)
BILIRUB SERPL-MCNC: 0.4 MG/DL (ref 0–1.2)
BILIRUB UR QL STRIP: NEGATIVE
BUN SERPL-MCNC: 15 MG/DL (ref 6–20)
CALCIUM SERPL-MCNC: 9 MG/DL (ref 8.6–10.2)
CHLORIDE SERPL-SCNC: 102 MMOL/L (ref 98–107)
CLARITY UR: ABNORMAL
CO2 SERPL-SCNC: 25 MMOL/L (ref 22–29)
COLOR UR: YELLOW
CREAT SERPL-MCNC: 0.8 MG/DL (ref 0.5–1)
EOSINOPHIL # BLD: 0.1 E9/L (ref 0.05–0.5)
EOSINOPHIL NFR BLD: 1.7 % (ref 0–6)
EPI CELLS #/AREA URNS HPF: ABNORMAL /HPF
ERYTHROCYTE [DISTWIDTH] IN BLOOD BY AUTOMATED COUNT: 11.9 FL (ref 11.5–15)
FERRITIN SERPL-MCNC: 139 NG/ML
GLUCOSE SERPL-MCNC: 83 MG/DL (ref 74–99)
GLUCOSE UR STRIP-MCNC: NEGATIVE MG/DL
HCT VFR BLD AUTO: 44.8 % (ref 34–48)
HGB BLD-MCNC: 15 G/DL (ref 11.5–15.5)
HGB UR QL STRIP: NEGATIVE
IMM GRANULOCYTES # BLD: 0.01 E9/L
IMM GRANULOCYTES NFR BLD: 0.2 % (ref 0–5)
IRON SATN MFR SERPL: 52 % (ref 15–50)
IRON SERPL-MCNC: 145 MCG/DL (ref 37–145)
KETONES UR STRIP-MCNC: NEGATIVE MG/DL
LEUKOCYTE ESTERASE UR QL STRIP: ABNORMAL
LYMPHOCYTES # BLD: 1.7 E9/L (ref 1.5–4)
LYMPHOCYTES NFR BLD: 28.6 % (ref 20–42)
MAGNESIUM SERPL-MCNC: 2.1 MG/DL (ref 1.6–2.6)
MCH RBC QN AUTO: 31.3 PG (ref 26–35)
MCHC RBC AUTO-ENTMCNC: 33.5 % (ref 32–34.5)
MCV RBC AUTO: 93.3 FL (ref 80–99.9)
MONOCYTES # BLD: 0.47 E9/L (ref 0.1–0.95)
MONOCYTES NFR BLD: 7.9 % (ref 2–12)
NEUTROPHILS # BLD: 3.62 E9/L (ref 1.8–7.3)
NEUTS SEG NFR BLD: 60.8 % (ref 43–80)
NITRITE UR QL STRIP: NEGATIVE
PH UR STRIP: 6 [PH] (ref 5–9)
PHOSPHATE SERPL-MCNC: 3.2 MG/DL (ref 2.5–4.5)
PLATELET # BLD AUTO: 237 E9/L (ref 130–450)
PMV BLD AUTO: 9.8 FL (ref 7–12)
POTASSIUM SERPL-SCNC: 4 MMOL/L (ref 3.5–5)
PROT SERPL-MCNC: 6.8 G/DL (ref 6.4–8.3)
PROT UR STRIP-MCNC: NEGATIVE MG/DL
PTH-INTACT SERPL-MCNC: 47 PG/ML (ref 15–65)
RBC # BLD AUTO: 4.8 E12/L (ref 3.5–5.5)
RBC #/AREA URNS HPF: ABNORMAL /HPF (ref 0–2)
SODIUM SERPL-SCNC: 137 MMOL/L (ref 132–146)
SP GR UR STRIP: 1.02 (ref 1–1.03)
TIBC SERPL-MCNC: 281 MCG/DL (ref 250–450)
TSH SERPL-MCNC: 3.21 UIU/ML (ref 0.27–4.2)
URATE SERPL-MCNC: 3.9 MG/DL (ref 2.4–5.7)
UROBILINOGEN UR STRIP-ACNC: 0.2 E.U./DL
VIT B12 SERPL-MCNC: 1232 PG/ML (ref 211–946)
VITAMIN D 25-HYDROXY: 62 NG/ML (ref 30–100)
WBC # BLD: 6 E9/L (ref 4.5–11.5)
WBC #/AREA URNS HPF: ABNORMAL /HPF (ref 0–5)

## 2023-03-15 PROCEDURE — 83550 IRON BINDING TEST: CPT

## 2023-03-15 PROCEDURE — 82728 ASSAY OF FERRITIN: CPT

## 2023-03-15 PROCEDURE — 83540 ASSAY OF IRON: CPT

## 2023-03-15 PROCEDURE — 84597 ASSAY OF VITAMIN K: CPT

## 2023-03-15 PROCEDURE — 36415 COLL VENOUS BLD VENIPUNCTURE: CPT

## 2023-03-15 PROCEDURE — 82306 VITAMIN D 25 HYDROXY: CPT

## 2023-03-15 PROCEDURE — 84550 ASSAY OF BLOOD/URIC ACID: CPT

## 2023-03-15 PROCEDURE — 83970 ASSAY OF PARATHORMONE: CPT

## 2023-03-15 PROCEDURE — 84630 ASSAY OF ZINC: CPT

## 2023-03-15 PROCEDURE — 81001 URINALYSIS AUTO W/SCOPE: CPT

## 2023-03-15 PROCEDURE — 84207 ASSAY OF VITAMIN B-6: CPT

## 2023-03-15 PROCEDURE — 85025 COMPLETE CBC W/AUTO DIFF WBC: CPT

## 2023-03-15 PROCEDURE — 80053 COMPREHEN METABOLIC PANEL: CPT

## 2023-03-15 PROCEDURE — 84425 ASSAY OF VITAMIN B-1: CPT

## 2023-03-15 PROCEDURE — 83735 ASSAY OF MAGNESIUM: CPT

## 2023-03-15 PROCEDURE — 84100 ASSAY OF PHOSPHORUS: CPT

## 2023-03-15 PROCEDURE — 82607 VITAMIN B-12: CPT

## 2023-03-15 PROCEDURE — 84443 ASSAY THYROID STIM HORMONE: CPT

## 2023-03-17 LAB — ZINC SERPL-MCNC: 86.7 UG/DL (ref 60–120)

## 2023-03-20 LAB — VIT B6 SERPL-MCNC: 83.2 NMOL/L (ref 20–125)

## 2023-03-23 LAB — VIT B1 BLD-MCNC: 153 NMOL/L (ref 70–180)

## 2023-04-08 LAB — PHYTONADIONE SERPL-MCNC: 0.41 NMOL/L (ref 0.22–4.88)

## 2023-04-25 ENCOUNTER — TELEPHONE (OUTPATIENT)
Dept: FAMILY MEDICINE CLINIC | Age: 38
End: 2023-04-25

## 2023-05-01 ENCOUNTER — OFFICE VISIT (OUTPATIENT)
Dept: PSYCHOLOGY | Age: 38
End: 2023-05-01

## 2023-05-01 DIAGNOSIS — F33.41 MAJOR DEPRESSIVE DISORDER, RECURRENT EPISODE, IN PARTIAL REMISSION WITH ANXIOUS DISTRESS (HCC): Primary | ICD-10-CM

## 2023-05-01 NOTE — PROGRESS NOTES
Behavioral Health Assessment   [facility]    Time Start: 2:00 p.m. Time End:  3:00 p.m. Date of Service:  2023    Referral Information:  Referred by: Self, returning patient   Reason for referral:  Work stressors, burnout    Basis of Evaluation:    [x]  Clinical Interview  [x]  Review of Medical Record [x] Patient Health Questionnaire (PHQ); PRIYANKA-7    Presenting Concerns and History of present condition: Precious Don is a returning patient to this provider, having been treated between approximately 8219-1650 intermittently for depression at this providers's previous practice, [facility]. She continues psychotropic medication management at [facility] with Dagoberto Barker NP. She is requesting reinitiation of services due to work related over commitments, and risk for burnout in professional and leisure activities. She is an  of [employer] at [employer]. 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 Has difficulty declining when she is asked to take on more work. Has difficulty with procrastination and easily \"shuts down. \"  During the previous course of therapy in 2019, she was up for tenure and procrastinated getting publications finished to meet her deadlines. Some of the peer faculty in the department voted that she should thus not receive tenure.   However with the support of her Chair and Denise Lawler, she

## 2023-05-02 ENCOUNTER — OFFICE VISIT (OUTPATIENT)
Dept: FAMILY MEDICINE CLINIC | Age: 38
End: 2023-05-02
Payer: COMMERCIAL

## 2023-05-02 VITALS
BODY MASS INDEX: 34.31 KG/M2 | RESPIRATION RATE: 16 BRPM | HEIGHT: 64 IN | HEART RATE: 96 BPM | SYSTOLIC BLOOD PRESSURE: 138 MMHG | OXYGEN SATURATION: 99 % | TEMPERATURE: 97.7 F | DIASTOLIC BLOOD PRESSURE: 86 MMHG | WEIGHT: 201 LBS

## 2023-05-02 DIAGNOSIS — H66.003 ACUTE SUPPURATIVE OTITIS MEDIA OF BOTH EARS WITHOUT SPONTANEOUS RUPTURE OF TYMPANIC MEMBRANES, RECURRENCE NOT SPECIFIED: Primary | ICD-10-CM

## 2023-05-02 DIAGNOSIS — J02.9 SORE THROAT: ICD-10-CM

## 2023-05-02 DIAGNOSIS — R09.82 POST-NASAL DRIP: ICD-10-CM

## 2023-05-02 PROBLEM — F33.41 MAJOR DEPRESSIVE DISORDER, RECURRENT EPISODE, IN PARTIAL REMISSION WITH ANXIOUS DISTRESS (HCC): Status: ACTIVE | Noted: 2023-05-02

## 2023-05-02 LAB
INFLUENZA A ANTIBODY: NORMAL
INFLUENZA B ANTIBODY: NORMAL
Lab: NORMAL
PERFORMING INSTRUMENT: NORMAL
QC PASS/FAIL: NORMAL
S PYO AG THROAT QL: NORMAL
SARS-COV-2, POC: NORMAL

## 2023-05-02 PROCEDURE — 87880 STREP A ASSAY W/OPTIC: CPT | Performed by: FAMILY MEDICINE

## 2023-05-02 PROCEDURE — 99214 OFFICE O/P EST MOD 30 MIN: CPT | Performed by: FAMILY MEDICINE

## 2023-05-02 PROCEDURE — 87804 INFLUENZA ASSAY W/OPTIC: CPT | Performed by: FAMILY MEDICINE

## 2023-05-02 PROCEDURE — G8427 DOCREV CUR MEDS BY ELIG CLIN: HCPCS | Performed by: FAMILY MEDICINE

## 2023-05-02 PROCEDURE — G8417 CALC BMI ABV UP PARAM F/U: HCPCS | Performed by: FAMILY MEDICINE

## 2023-05-02 PROCEDURE — 87426 SARSCOV CORONAVIRUS AG IA: CPT | Performed by: FAMILY MEDICINE

## 2023-05-02 PROCEDURE — 1036F TOBACCO NON-USER: CPT | Performed by: FAMILY MEDICINE

## 2023-05-02 RX ORDER — BROMPHENIRAMINE MALEATE, PSEUDOEPHEDRINE HYDROCHLORIDE, AND DEXTROMETHORPHAN HYDROBROMIDE 2; 30; 10 MG/5ML; MG/5ML; MG/5ML
5 SYRUP ORAL 4 TIMES DAILY PRN
Qty: 240 ML | Refills: 1 | Status: SHIPPED | OUTPATIENT
Start: 2023-05-02 | End: 2023-06-01

## 2023-05-02 RX ORDER — BENZONATATE 100 MG/1
100 CAPSULE ORAL 2 TIMES DAILY PRN
Qty: 20 CAPSULE | Refills: 0 | Status: SHIPPED | OUTPATIENT
Start: 2023-05-02 | End: 2023-05-09

## 2023-05-02 RX ORDER — AMOXICILLIN AND CLAVULANATE POTASSIUM 875; 125 MG/1; MG/1
1 TABLET, FILM COATED ORAL 2 TIMES DAILY WITH MEALS
Qty: 20 TABLET | Refills: 0 | Status: SHIPPED | OUTPATIENT
Start: 2023-05-02 | End: 2023-05-12

## 2023-05-02 ASSESSMENT — PATIENT HEALTH QUESTIONNAIRE - PHQ9
7. TROUBLE CONCENTRATING ON THINGS, SUCH AS READING THE NEWSPAPER OR WATCHING TELEVISION: 0
SUM OF ALL RESPONSES TO PHQ QUESTIONS 1-9: 5
8. MOVING OR SPEAKING SO SLOWLY THAT OTHER PEOPLE COULD HAVE NOTICED. OR THE OPPOSITE, BEING SO FIGETY OR RESTLESS THAT YOU HAVE BEEN MOVING AROUND A LOT MORE THAN USUAL: 0
5. POOR APPETITE OR OVEREATING: 2
10. IF YOU CHECKED OFF ANY PROBLEMS, HOW DIFFICULT HAVE THESE PROBLEMS MADE IT FOR YOU TO DO YOUR WORK, TAKE CARE OF THINGS AT HOME, OR GET ALONG WITH OTHER PEOPLE: 1
SUM OF ALL RESPONSES TO PHQ9 QUESTIONS 1 & 2: 0
3. TROUBLE FALLING OR STAYING ASLEEP: 2
SUM OF ALL RESPONSES TO PHQ QUESTIONS 1-9: 5
9. THOUGHTS THAT YOU WOULD BE BETTER OFF DEAD, OR OF HURTING YOURSELF: 0
1. LITTLE INTEREST OR PLEASURE IN DOING THINGS: 0
4. FEELING TIRED OR HAVING LITTLE ENERGY: 1
2. FEELING DOWN, DEPRESSED OR HOPELESS: 0
6. FEELING BAD ABOUT YOURSELF - OR THAT YOU ARE A FAILURE OR HAVE LET YOURSELF OR YOUR FAMILY DOWN: 0

## 2023-05-02 ASSESSMENT — ANXIETY QUESTIONNAIRES
7. FEELING AFRAID AS IF SOMETHING AWFUL MIGHT HAPPEN: 0
5. BEING SO RESTLESS THAT IT IS HARD TO SIT STILL: 0
IF YOU CHECKED OFF ANY PROBLEMS ON THIS QUESTIONNAIRE, HOW DIFFICULT HAVE THESE PROBLEMS MADE IT FOR YOU TO DO YOUR WORK, TAKE CARE OF THINGS AT HOME, OR GET ALONG WITH OTHER PEOPLE: SOMEWHAT DIFFICULT
3. WORRYING TOO MUCH ABOUT DIFFERENT THINGS: 1
4. TROUBLE RELAXING: 1
GAD7 TOTAL SCORE: 4
2. NOT BEING ABLE TO STOP OR CONTROL WORRYING: 0
6. BECOMING EASILY ANNOYED OR IRRITABLE: 1
1. FEELING NERVOUS, ANXIOUS, OR ON EDGE: 1

## 2023-05-02 NOTE — PROGRESS NOTES
syrup      3. Sore throat  POCT COVID-19, Antigen    POCT rapid strep A    POCT Influenza A/B            Continue to alternate flonase, azelastine every other day  Treat otitis media with augmentin  Alternate tessalon and bromfed      RTO: Return if symptoms worsen or fail to improve.         An electronic signature was used to authenticate this note.  ---- Kirt Gold MD on 5/2/2023 at 9:42 AM

## 2023-05-16 ENCOUNTER — TELEMEDICINE (OUTPATIENT)
Dept: PSYCHOLOGY | Age: 38
End: 2023-05-16
Payer: COMMERCIAL

## 2023-05-16 DIAGNOSIS — F33.41 MAJOR DEPRESSIVE DISORDER, RECURRENT EPISODE, IN PARTIAL REMISSION WITH ANXIOUS DISTRESS (HCC): Primary | ICD-10-CM

## 2023-05-16 PROCEDURE — 90837 PSYTX W PT 60 MINUTES: CPT | Performed by: PSYCHOLOGIST

## 2023-05-16 NOTE — PROGRESS NOTES
[address] [place] [address]    TeleMedicine Patient Consent    This visit was performed as a virtual video visit using a synchronous, two-way, audio-video telehealth technology platform. Patient identification was verified at the start of the visit, including the patient's telephone number and physical location. I discussed with the patient the nature of our telehealth visits, that:     There are potential benefits and risks of video-conferencing (e.g. limits to patient confidentiality) that differ from in-person sessions. Confidentiality still applies for telepsychology services, and nobody will record the session without the permission from the others person(s). We agree to use the video-conferencing platform selected for our virtual sessions, and the psychologist will explain how to use it. You need to use a webcam or smartphone during the session. It is important to be in a quiet, private space that is free of distractions (including cell phone or other devices) during the session. It is important to use a secure internet connection rather than public/free Wi-Fi. We need a back-up plan (e.g., phone number where you can be reached) to restart the session or to reschedule it, in the event of technical problems. We need a safety plan that includes at least one emergency contact and the closest ER to your location, in the event of a crisis situation. If you are not an adult, we need the permission of your parent or legal guardian (and their contact information) for you to participate in telepsychology sessions. As your psychologist, I may determine that due to certain circumstances, telepsychology is no longer appropriate and that we should resume our sessions in-person.    As a TeleMedicine visit, this encounter will generate charges, similar to office visits, which may or may not be fully paid by the insurance, so the patient may be financially responsible for this

## 2023-06-01 ENCOUNTER — OFFICE VISIT (OUTPATIENT)
Dept: PSYCHOLOGY | Age: 38
End: 2023-06-01
Payer: COMMERCIAL

## 2023-06-01 DIAGNOSIS — F33.41 MAJOR DEPRESSIVE DISORDER, RECURRENT EPISODE, IN PARTIAL REMISSION WITH ANXIOUS DISTRESS (HCC): Primary | ICD-10-CM

## 2023-06-01 PROCEDURE — 1036F TOBACCO NON-USER: CPT | Performed by: PSYCHOLOGIST

## 2023-06-01 PROCEDURE — 90834 PSYTX W PT 45 MINUTES: CPT | Performed by: PSYCHOLOGIST

## 2023-06-01 NOTE — PROGRESS NOTES
Behavioral Health Follow-Up    [facility]    Time Start: 11:40 a.m. Time End:  12:25 p.m. Date of Service:  6/1/2023  Session #: 3    Subjective:  Working on improving organizational skills to meet employer expectations. Symptoms reported: Fatigue, procrastination, stress and low motivation    Objective:    Mental Status:    Appearance: Appears stated age and Well-kept   Sensorium: alert   Affect:  consistent with expectations based upon mood   Mood:   Anxious   Thought Process:  Goal-Directed   Thought Content: no evidence of impairment   Behavior:   Cooperative   Psychomotor: Relaxed   Speech:   appropriate quality, quantity and organization of sentences   Orientation:  oriented to person, place, time, and general circumstances   Judgment & Insight:  normal insight and judgment   Memory: Intact         Assessment:   Progress/response to treatment:   Since initiation of treatment: She has improved her meal planning efforts to improve her diet. Also researched some planning methods for academic writing. She and two colleagues submitted a book chapter for publication last week. Since last visit: Same as above    Risk: Focused assessment deferred, prior evaluation yielded minimal suicidal/homicidal risk and there are no new circumstances to warrant reassessment. Protective Factors: denial of impulses, hopeful for improved circumstances, future goals, and making progress in treatment      ICD-10-CM    1. Major depressive disorder, recurrent episode, in partial remission with anxious distress (HCC)  F33.41           Psychotherapy Intervention:  Modalities: Problem-Solving    Organization skills - problem-solved options to create improved organizational plan to accomplish goals. Focused on treatment plan goal to work on her Fall course prep. Discussed objective tasks (e.g., complete syllabus; choose readings; start discussion board).   Also discussed that for long-term writing projects,

## 2023-06-28 ENCOUNTER — OFFICE VISIT (OUTPATIENT)
Dept: PSYCHOLOGY | Age: 38
End: 2023-06-28
Payer: COMMERCIAL

## 2023-06-28 DIAGNOSIS — F33.41 MAJOR DEPRESSIVE DISORDER, RECURRENT EPISODE, IN PARTIAL REMISSION WITH ANXIOUS DISTRESS (HCC): Primary | ICD-10-CM

## 2023-06-28 PROCEDURE — 90834 PSYTX W PT 45 MINUTES: CPT | Performed by: PSYCHOLOGIST

## 2023-06-28 PROCEDURE — 1036F TOBACCO NON-USER: CPT | Performed by: PSYCHOLOGIST

## 2023-07-17 ENCOUNTER — OFFICE VISIT (OUTPATIENT)
Dept: PSYCHOLOGY | Age: 38
End: 2023-07-17
Payer: COMMERCIAL

## 2023-07-17 DIAGNOSIS — F33.41 MAJOR DEPRESSIVE DISORDER, RECURRENT EPISODE, IN PARTIAL REMISSION WITH ANXIOUS DISTRESS (HCC): Primary | ICD-10-CM

## 2023-07-17 PROCEDURE — 1036F TOBACCO NON-USER: CPT | Performed by: PSYCHOLOGIST

## 2023-07-17 PROCEDURE — 90832 PSYTX W PT 30 MINUTES: CPT | Performed by: PSYCHOLOGIST

## 2023-07-17 NOTE — PROGRESS NOTES
Behavioral Health Follow-Up   [facility]    Time Start: 2:45 p.m. Time End:  3:15 p.m. Date of Service:  7/17/2023  Session #: 6    Subjective:  Working on improving organizational skills to meet employer expectations. Dog passed away around 2 weeks ago. Symptoms reported: Fatigue, procrastination, stress, grief and low motivation    Objective:    Mental Status:    Appearance: Appears stated age and Well-kept   Sensorium: alert   Affect:  consistent with expectations based upon mood   Mood:   Dysphoric   Thought Process:  Goal-Directed   Thought Content: no evidence of impairment   Behavior:   Cooperative   Psychomotor: Relaxed   Speech:   appropriate quality, quantity and organization of sentences   Orientation:  oriented to person, place, time, and general circumstances   Judgment & Insight:  normal insight and judgment   Memory: Intact         Assessment:   Progress/response to treatment:   Since initiation of treatment: Improved her meal planning. Researched plans for creating an academic writing plan. Submitted a book chapter publication. Developed system for creating weekly plan that includes all professional and personal tasks in a color-coded system. She is successful with her time management plan to add at least 15 to 30 minutes to her preparations to leave the house. 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 Since last visit: She is understandably grieving the death of her dog, and has not felt very motivated to work on writing goals. Her mother was also visiting from out of state. However she has made very good progress on organizing her home office. Risk: Focused assessment deferred, prior evaluation yielded minimal suicidal/homicidal risk and there are no new circumstances to warrant reassessment.       Protective Factors: denial of impulses, hopeful for improved circumstances, future goals, and making progress in

## 2023-07-24 DIAGNOSIS — E03.9 ACQUIRED HYPOTHYROIDISM: ICD-10-CM

## 2023-07-24 RX ORDER — LEVOTHYROXINE SODIUM 0.1 MG/1
TABLET ORAL
Qty: 90 TABLET | Refills: 3 | OUTPATIENT
Start: 2023-07-24

## 2023-07-31 ENCOUNTER — OFFICE VISIT (OUTPATIENT)
Dept: PSYCHOLOGY | Age: 38
End: 2023-07-31
Payer: COMMERCIAL

## 2023-07-31 DIAGNOSIS — F33.41 MAJOR DEPRESSIVE DISORDER, RECURRENT EPISODE, IN PARTIAL REMISSION WITH ANXIOUS DISTRESS (HCC): Primary | ICD-10-CM

## 2023-07-31 PROCEDURE — 90834 PSYTX W PT 45 MINUTES: CPT | Performed by: PSYCHOLOGIST

## 2023-07-31 PROCEDURE — 1036F TOBACCO NON-USER: CPT | Performed by: PSYCHOLOGIST

## 2023-07-31 NOTE — PROGRESS NOTES
Behavioral Health Follow-Up   [facility]    Time Start: 2:30 p.m. Time End:  3:15 p.m. Date of Service:  7/31/2023  Session #: 7    Subjective:  Continues to work on improving organization and goal-setting to accomplish required tasks. Symptoms reported: Fatigue, procrastination, stress, grief and low motivation    Objective:    Mental Status:    Appearance: Appears stated age and Well-kept   Sensorium: alert   Affect:  consistent with expectations based upon mood   Mood:   Dysphoric, improving   Thought Process:  Goal-Directed   Thought Content: no evidence of impairment   Behavior:   Cooperative   Psychomotor: Relaxed   Speech:   appropriate quality, quantity and organization of sentences   Orientation:  oriented to person, place, time, and general circumstances   Judgment & Insight:  normal insight and judgment   Memory: Intact         Assessment:   Progress/response to treatment:   Since initiation of treatment: Improved her meal planning. Researched plans for creating an academic writing plan. Submitted a book chapter publication. Developed system for creating weekly plan that includes all professional and personal tasks in a color-coded system. She is successful with her time management plan to add at least 15 to 30 minutes to her preparations to leave the house. 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 Since last visit: Completed home improvement painting projects. Organized home office space and basement. Risk: Focused assessment deferred, prior evaluation yielded minimal suicidal/homicidal risk and there are no new circumstances to warrant reassessment. Protective Factors: denial of impulses, hopeful for improved circumstances, future goals, and making progress in treatment      ICD-10-CM    1.  Major depressive disorder, recurrent episode, in partial remission with anxious distress [facility])  F33.41               Psychotherapy

## 2023-08-10 ENCOUNTER — OFFICE VISIT (OUTPATIENT)
Dept: FAMILY MEDICINE CLINIC | Age: 38
End: 2023-08-10
Payer: COMMERCIAL

## 2023-08-10 VITALS
RESPIRATION RATE: 16 BRPM | WEIGHT: 196.2 LBS | DIASTOLIC BLOOD PRESSURE: 70 MMHG | BODY MASS INDEX: 33.68 KG/M2 | TEMPERATURE: 98.1 F | HEART RATE: 85 BPM | SYSTOLIC BLOOD PRESSURE: 116 MMHG | OXYGEN SATURATION: 99 %

## 2023-08-10 DIAGNOSIS — H66.90 ACUTE OTITIS MEDIA, UNSPECIFIED OTITIS MEDIA TYPE: Primary | ICD-10-CM

## 2023-08-10 PROCEDURE — 1036F TOBACCO NON-USER: CPT | Performed by: PHYSICIAN ASSISTANT

## 2023-08-10 PROCEDURE — G8427 DOCREV CUR MEDS BY ELIG CLIN: HCPCS | Performed by: PHYSICIAN ASSISTANT

## 2023-08-10 PROCEDURE — G8417 CALC BMI ABV UP PARAM F/U: HCPCS | Performed by: PHYSICIAN ASSISTANT

## 2023-08-10 PROCEDURE — 99213 OFFICE O/P EST LOW 20 MIN: CPT | Performed by: PHYSICIAN ASSISTANT

## 2023-08-10 RX ORDER — AMOXICILLIN AND CLAVULANATE POTASSIUM 875; 125 MG/1; MG/1
1 TABLET, FILM COATED ORAL 2 TIMES DAILY
Qty: 20 TABLET | Refills: 0 | Status: SHIPPED | OUTPATIENT
Start: 2023-08-10 | End: 2023-08-20

## 2023-08-15 ENCOUNTER — OFFICE VISIT (OUTPATIENT)
Dept: PSYCHOLOGY | Age: 38
End: 2023-08-15
Payer: COMMERCIAL

## 2023-08-15 DIAGNOSIS — F33.41 MAJOR DEPRESSIVE DISORDER, RECURRENT EPISODE, IN PARTIAL REMISSION WITH ANXIOUS DISTRESS (HCC): Primary | ICD-10-CM

## 2023-08-15 PROCEDURE — 1036F TOBACCO NON-USER: CPT | Performed by: PSYCHOLOGIST

## 2023-08-15 PROCEDURE — 90832 PSYTX W PT 30 MINUTES: CPT | Performed by: PSYCHOLOGIST

## 2023-08-16 NOTE — PROGRESS NOTES
Behavioral Health Follow-Up   [facility]    Time Start: 2:30 p.m. Time End:  3:00 p.m. Date of Service:  8/15/2023  Session #: 8    Subjective:  Continues to work on improving organization and goal-setting to accomplish required tasks. Symptoms reported: Fatigue, procrastination, stress, and low motivation    Objective:    Mental Status:    Appearance: Appears stated age and Well-kept   Sensorium: alert   Affect:  consistent with expectations based upon mood   Mood:   Dysphoric, improving   Thought Process:  Goal-Directed   Thought Content: no evidence of impairment   Behavior:   Cooperative   Psychomotor: Relaxed   Speech:   appropriate quality, quantity and organization of sentences   Orientation:  oriented to person, place, time, and general circumstances   Judgment & Insight:  normal insight and judgment   Memory: Intact         Assessment:   Progress/response to treatment:   Since initiation of treatment: Improved her meal planning. Researched plans for creating an academic writing plan. Submitted a book chapter publication. Developed system for creating weekly plan that includes all professional and personal tasks in a color-coded system. She is successful with her time management plan to add at least 15 to 30 minutes to her preparations to leave the house. 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 Completed home improvement painting projects. Organized home office space and basement. Since last visit: Completed new course prep. Risk: Focused assessment deferred, prior evaluation yielded minimal suicidal/homicidal risk and there are no new circumstances to warrant reassessment. Protective Factors: denial of impulses, hopeful for improved circumstances, future goals, and making progress in treatment      ICD-10-CM    1.  Major depressive disorder, recurrent episode, in partial remission with anxious distress ([facility])  F33.41

## 2023-08-19 DIAGNOSIS — E03.9 ACQUIRED HYPOTHYROIDISM: ICD-10-CM

## 2023-08-21 RX ORDER — LEVOTHYROXINE SODIUM 0.1 MG/1
100 TABLET ORAL DAILY
Qty: 90 TABLET | Refills: 0 | Status: SHIPPED | OUTPATIENT
Start: 2023-08-21

## 2023-08-21 NOTE — TELEPHONE ENCOUNTER
Medication Refill Request    LOV 8/10/2023  NOV 12/13/2023    Lab Results   Component Value Date    CREATININE 0.8 03/15/2023

## 2023-09-11 ENCOUNTER — OFFICE VISIT (OUTPATIENT)
Dept: PSYCHOLOGY | Age: 38
End: 2023-09-11
Payer: COMMERCIAL

## 2023-09-11 DIAGNOSIS — F33.41 MAJOR DEPRESSIVE DISORDER, RECURRENT EPISODE, IN PARTIAL REMISSION WITH ANXIOUS DISTRESS (HCC): Primary | ICD-10-CM

## 2023-09-11 PROCEDURE — 1036F TOBACCO NON-USER: CPT | Performed by: PSYCHOLOGIST

## 2023-09-11 PROCEDURE — 90834 PSYTX W PT 45 MINUTES: CPT | Performed by: PSYCHOLOGIST

## 2023-09-12 NOTE — PROGRESS NOTES
Behavioral Health Follow-Up   [facility]    Time Start: 11:00 a.m. Time End:  11:40 a.m. Date of Service:  2023  Session #: 9    Subjective:  Continues to work on improving organization and goal-setting to accomplish required tasks. A colleague recently  of cancer, she had been providing support (rides, etc) in her last few months. Symptoms reported: Grief, stress, frustration    Objective:    Mental Status:    Appearance: Appears stated age and Well-kept   Sensorium: alert   Affect:  consistent with expectations based upon mood   Mood:   Dysphoric, improving   Thought Process:  Goal-Directed   Thought Content: no evidence of impairment   Behavior:   Cooperative   Psychomotor: Relaxed   Speech:   appropriate quality, quantity and organization of sentences   Orientation:  oriented to person, place, time, and general circumstances   Judgment & Insight:  normal insight and judgment   Memory: Intact         Assessment:   Progress/response to treatment:   Since initiation of treatment: Established academic writing plan. Submitted a book chapter publication. Using system for creating weekly plan that includes all professional and personal tasks in a color-coded system. She is successful with her time management plan to add at least 15 to 30 minutes to her preparations to leave the house. 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 Completed home improvement painting projects. Organized home office space and basement. 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 Risk: Focused assessment deferred, prior evaluation yielded minimal suicidal/homicidal risk and there are no new circumstances to warrant reassessment. Protective Factors: denial of impulses, hopeful for improved circumstances, future goals, and making progress in treatment      ICD-10-CM    1.  Major depressive disorder, recurrent

## 2023-09-20 ENCOUNTER — PATIENT MESSAGE (OUTPATIENT)
Dept: FAMILY MEDICINE CLINIC | Age: 38
End: 2023-09-20

## 2023-09-26 ENCOUNTER — TELEPHONE (OUTPATIENT)
Dept: FAMILY MEDICINE CLINIC | Age: 38
End: 2023-09-26

## 2023-09-26 NOTE — TELEPHONE ENCOUNTER
Pt called in stating her  tested positive for covid and she's developed symptoms overnight. She has not tested yet. She received her covid vaccine last week and would like to know if shes eligible for paxlovid if she ends up testing positive. Please advise thanks.

## 2023-09-27 ENCOUNTER — OFFICE VISIT (OUTPATIENT)
Dept: FAMILY MEDICINE CLINIC | Age: 38
End: 2023-09-27
Payer: COMMERCIAL

## 2023-09-27 VITALS
HEART RATE: 100 BPM | DIASTOLIC BLOOD PRESSURE: 62 MMHG | OXYGEN SATURATION: 98 % | SYSTOLIC BLOOD PRESSURE: 124 MMHG | WEIGHT: 200 LBS | TEMPERATURE: 98.3 F | RESPIRATION RATE: 18 BRPM | BODY MASS INDEX: 34.33 KG/M2

## 2023-09-27 DIAGNOSIS — U07.1 COVID-19: Primary | ICD-10-CM

## 2023-09-27 LAB
Lab: ABNORMAL
PERFORMING INSTRUMENT: ABNORMAL
QC PASS/FAIL: ABNORMAL
SARS-COV-2, POC: DETECTED

## 2023-09-27 PROCEDURE — G8427 DOCREV CUR MEDS BY ELIG CLIN: HCPCS | Performed by: FAMILY MEDICINE

## 2023-09-27 PROCEDURE — 87426 SARSCOV CORONAVIRUS AG IA: CPT | Performed by: FAMILY MEDICINE

## 2023-09-27 PROCEDURE — 1036F TOBACCO NON-USER: CPT | Performed by: FAMILY MEDICINE

## 2023-09-27 PROCEDURE — G8417 CALC BMI ABV UP PARAM F/U: HCPCS | Performed by: FAMILY MEDICINE

## 2023-09-27 PROCEDURE — 99214 OFFICE O/P EST MOD 30 MIN: CPT | Performed by: FAMILY MEDICINE

## 2023-09-27 RX ORDER — PREDNISONE 20 MG/1
20 TABLET ORAL 2 TIMES DAILY
Qty: 10 TABLET | Refills: 0 | Status: SHIPPED | OUTPATIENT
Start: 2023-09-27 | End: 2023-10-02

## 2023-09-27 RX ORDER — BROMPHENIRAMINE MALEATE, PSEUDOEPHEDRINE HYDROCHLORIDE, AND DEXTROMETHORPHAN HYDROBROMIDE 2; 30; 10 MG/5ML; MG/5ML; MG/5ML
5 SYRUP ORAL 4 TIMES DAILY PRN
Qty: 118 ML | Refills: 0 | Status: SHIPPED | OUTPATIENT
Start: 2023-09-27

## 2023-09-27 RX ORDER — BENZONATATE 100 MG/1
100 CAPSULE ORAL 3 TIMES DAILY PRN
Qty: 30 CAPSULE | Refills: 0 | Status: SHIPPED | OUTPATIENT
Start: 2023-09-27 | End: 2023-10-07

## 2023-10-16 ENCOUNTER — OFFICE VISIT (OUTPATIENT)
Dept: PSYCHOLOGY | Age: 38
End: 2023-10-16
Payer: COMMERCIAL

## 2023-10-16 DIAGNOSIS — F33.41 MAJOR DEPRESSIVE DISORDER, RECURRENT EPISODE, IN PARTIAL REMISSION WITH ANXIOUS DISTRESS (HCC): Primary | ICD-10-CM

## 2023-10-16 PROCEDURE — 1036F TOBACCO NON-USER: CPT | Performed by: PSYCHOLOGIST

## 2023-10-16 PROCEDURE — 90834 PSYTX W PT 45 MINUTES: CPT | Performed by: PSYCHOLOGIST

## 2023-10-16 NOTE — PROGRESS NOTES
months). To eliminate stress eating. 30% reduction on PHQ-9 and PRIYANKA-7   Target time frame: 1-2 months or ~ 3-5 visits    2-3 months or ~ 4-6 visits 3-6 months or ~ 6-8 visits   Notes on progress:  As of 7/31/23 -  Has developed color-coded organizational system, will need to wait until Fall semester begins to fully implement. Completed home office organization. Has declined some responsibilities with [employer] voters, and also did some advance planning for their activities for the fall 2023 election. Chosen book for new course prep. Goals met as of 9/11/23        Visits will occur every 2-4 weeks. Length of treatment plan: 3- 6 months     Types of treatment to be used: Cognitive Behavioral     Other professional, community, and/or natural supports involved in treatment: Duane Finland, NP ([facility] - psychotropic medication management)     Hannah Le is in agreement with this treatment plan and was provided a copy.

## 2023-11-01 DIAGNOSIS — E03.9 ACQUIRED HYPOTHYROIDISM: ICD-10-CM

## 2023-11-01 RX ORDER — LEVOTHYROXINE SODIUM 0.1 MG/1
100 TABLET ORAL DAILY
Qty: 90 TABLET | Refills: 3 | OUTPATIENT
Start: 2023-11-01

## 2023-11-10 DIAGNOSIS — E03.9 ACQUIRED HYPOTHYROIDISM: ICD-10-CM

## 2023-11-13 ENCOUNTER — OFFICE VISIT (OUTPATIENT)
Dept: PSYCHOLOGY | Age: 38
End: 2023-11-13
Payer: COMMERCIAL

## 2023-11-13 DIAGNOSIS — F33.41 MAJOR DEPRESSIVE DISORDER, RECURRENT EPISODE, IN PARTIAL REMISSION WITH ANXIOUS DISTRESS (HCC): Primary | ICD-10-CM

## 2023-11-13 PROCEDURE — 1036F TOBACCO NON-USER: CPT | Performed by: PSYCHOLOGIST

## 2023-11-13 PROCEDURE — 90834 PSYTX W PT 45 MINUTES: CPT | Performed by: PSYCHOLOGIST

## 2023-11-13 RX ORDER — LEVOTHYROXINE SODIUM 0.1 MG/1
100 TABLET ORAL DAILY
Qty: 90 TABLET | Refills: 0 | Status: SHIPPED | OUTPATIENT
Start: 2023-11-13

## 2023-11-13 NOTE — TELEPHONE ENCOUNTER
Medication Refill Request    LOV 9/27/2023  NOV 12/13/2023    Lab Results   Component Value Date    CREATININE 0.8 03/15/2023

## 2023-11-13 NOTE — TELEPHONE ENCOUNTER
Medication Refill Request    LOV 9/27/2023  NOV 12/13/2023    Lab Results   Component Value Date    CREATININE 0.8 03/15/2023     Pt request

## 2023-11-14 NOTE — PROGRESS NOTES
Behavioral Health Follow-Up   [facility]    Time Start: 1:00 p.m. Time End:  1:45 p.m. Date of Service:  11/13/2023  Session #: 11    Subjective:  Continues to work on addressing motivation to improve her time management skills and academic productivity. Symptoms reported: Stress, frustration    Objective:    Mental Status:    Appearance: Appears stated age and Well-kept   Sensorium: alert   Affect:  consistent with expectations based upon mood   Mood:   Dysphoric, improving   Thought Process:  Goal-Directed   Thought Content: no evidence of impairment   Behavior:   Cooperative   Psychomotor: Relaxed   Speech:   appropriate quality, quantity and organization of sentences   Orientation:  oriented to person, place, time, and general circumstances   Judgment & Insight:  normal insight and judgment   Memory: Intact         Assessment:   Progress/response to treatment:   Since initiation of treatment: Established academic writing plan. Submitted a book chapter publication. Using system for creating weekly plan that includes all professional and personal tasks in a color-coded system. She is successful with her time management plan to add at least 15 to 30 minutes to her preparations to leave the house. 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 Completed home improvement painting projects. Organized home office space and basement. Since last visit: Ready to give up major time commitment of [employer]. Having difficulty with improving her eating schedule. Risk: Focused assessment deferred, prior evaluation yielded minimal suicidal/homicidal risk and there are no new circumstances to warrant reassessment. Protective Factors: denial of impulses, hopeful for improved circumstances, future goals, and making progress in treatment      ICD-10-CM    1.  Major depressive disorder, recurrent episode, in partial remission with anxious

## 2023-12-13 ENCOUNTER — OFFICE VISIT (OUTPATIENT)
Dept: FAMILY MEDICINE CLINIC | Age: 38
End: 2023-12-13
Payer: COMMERCIAL

## 2023-12-13 VITALS
OXYGEN SATURATION: 98 % | DIASTOLIC BLOOD PRESSURE: 74 MMHG | TEMPERATURE: 97.3 F | SYSTOLIC BLOOD PRESSURE: 108 MMHG | WEIGHT: 200.9 LBS | BODY MASS INDEX: 34.48 KG/M2 | HEART RATE: 77 BPM

## 2023-12-13 DIAGNOSIS — E03.9 ACQUIRED HYPOTHYROIDISM: ICD-10-CM

## 2023-12-13 DIAGNOSIS — Z98.84 S/P LAPAROSCOPIC SLEEVE GASTRECTOMY: ICD-10-CM

## 2023-12-13 DIAGNOSIS — E78.5 HYPERLIPIDEMIA, UNSPECIFIED HYPERLIPIDEMIA TYPE: ICD-10-CM

## 2023-12-13 DIAGNOSIS — H69.93 DYSFUNCTION OF BOTH EUSTACHIAN TUBES: ICD-10-CM

## 2023-12-13 DIAGNOSIS — E03.9 ACQUIRED HYPOTHYROIDISM: Primary | ICD-10-CM

## 2023-12-13 DIAGNOSIS — Z23 NEED FOR VACCINATION: ICD-10-CM

## 2023-12-13 PROBLEM — H52.13 MYOPIA OF BOTH EYES: Status: RESOLVED | Noted: 2020-07-20 | Resolved: 2023-12-13

## 2023-12-13 LAB
BUN BLDV-MCNC: 13 MG/DL (ref 6–20)
CALCIUM SERPL-MCNC: 9.1 MG/DL (ref 8.6–10.2)
CHLORIDE BLD-SCNC: 98 MMOL/L (ref 98–107)
CHOLESTEROL: 210 MG/DL
CO2: 23 MMOL/L (ref 22–29)
CREAT SERPL-MCNC: 0.9 MG/DL (ref 0.5–1)
GFR SERPL CREATININE-BSD FRML MDRD: >60 ML/MIN/1.73M2
GLUCOSE BLD-MCNC: 86 MG/DL (ref 74–99)
HDLC SERPL-MCNC: 69 MG/DL
LDL CHOLESTEROL: 131 MG/DL
POTASSIUM SERPL-SCNC: 4.6 MMOL/L (ref 3.5–5)
SODIUM BLD-SCNC: 136 MMOL/L (ref 132–146)
TRIGL SERPL-MCNC: 49 MG/DL
TSH SERPL DL<=0.05 MIU/L-ACNC: 4.29 UIU/ML (ref 0.27–4.2)
VLDLC SERPL CALC-MCNC: 10 MG/DL

## 2023-12-13 PROCEDURE — 90471 IMMUNIZATION ADMIN: CPT | Performed by: FAMILY MEDICINE

## 2023-12-13 PROCEDURE — 99214 OFFICE O/P EST MOD 30 MIN: CPT | Performed by: FAMILY MEDICINE

## 2023-12-13 PROCEDURE — 1036F TOBACCO NON-USER: CPT | Performed by: FAMILY MEDICINE

## 2023-12-13 PROCEDURE — G8417 CALC BMI ABV UP PARAM F/U: HCPCS | Performed by: FAMILY MEDICINE

## 2023-12-13 PROCEDURE — G8482 FLU IMMUNIZE ORDER/ADMIN: HCPCS | Performed by: FAMILY MEDICINE

## 2023-12-13 PROCEDURE — G8427 DOCREV CUR MEDS BY ELIG CLIN: HCPCS | Performed by: FAMILY MEDICINE

## 2023-12-13 PROCEDURE — 90677 PCV20 VACCINE IM: CPT | Performed by: FAMILY MEDICINE

## 2023-12-13 RX ORDER — LEVOTHYROXINE SODIUM 0.1 MG/1
100 TABLET ORAL DAILY
Qty: 90 TABLET | Refills: 1 | Status: SHIPPED
Start: 2023-12-13 | End: 2023-12-13 | Stop reason: SDUPTHER

## 2023-12-13 RX ORDER — LEVOTHYROXINE SODIUM 112 UG/1
112 TABLET ORAL DAILY
Qty: 90 TABLET | Refills: 1 | Status: SHIPPED | OUTPATIENT
Start: 2023-12-13

## 2023-12-13 RX ORDER — AZELASTINE 1 MG/ML
1 SPRAY, METERED NASAL DAILY
Qty: 60 ML | Refills: 5 | Status: SHIPPED | OUTPATIENT
Start: 2023-12-13

## 2023-12-13 NOTE — PROGRESS NOTES
100 MCG tablet    TSH      2. Dysfunction of both eustachian tubes  azelastine (ASTELIN) 0.1 % nasal spray      3. S/P laparoscopic sleeve gastrectomy  Vitamin K    PTH, Intact    Vitamin B12 & Folate    Vitamin B6    Vitamin B1    Vitamin D 25 Hydroxy    Zinc    Ferritin    Iron and TIBC    TSH    Uric Acid    Magnesium    Phosphorus    Comprehensive Metabolic Panel    CBC with Auto Differential      4. Hyperlipidemia, unspecified hyperlipidemia type        5. Need for vaccination  Pneumococcal, PCV20, PREVNAR 21, (age 6w+), IM, PF            Continue to alternate flonase, azelastine every other day  Continue synthroid 100mcg  Continue to wean effexor to effective dose as able, slowly  continue to work on diet and exercise  Future labs as ordered    RTO: Return in about 4 months (around 4/13/2024) for chronic disease / routine f/u.         An electronic signature was used to authenticate this note.  ---- Teagan Hurst MD on 12/13/2023 at 1:47 PM

## 2024-01-08 ENCOUNTER — OFFICE VISIT (OUTPATIENT)
Dept: PSYCHOLOGY | Age: 39
End: 2024-01-08
Payer: COMMERCIAL

## 2024-01-08 DIAGNOSIS — F33.41 MAJOR DEPRESSIVE DISORDER, RECURRENT EPISODE, IN PARTIAL REMISSION WITH ANXIOUS DISTRESS (HCC): Primary | ICD-10-CM

## 2024-01-08 PROCEDURE — 1036F TOBACCO NON-USER: CPT | Performed by: PSYCHOLOGIST

## 2024-01-08 PROCEDURE — 90834 PSYTX W PT 45 MINUTES: CPT | Performed by: PSYCHOLOGIST

## 2024-01-08 NOTE — PROGRESS NOTES
Behavioral Health Follow-Up   [facility]    Time Start: 1:00 p.m.  Time End:  1:45 p.m.  Date of Service:  1/8/2024  Session #: 12    Subjective:  Occupational stressors.    Symptoms reported: Stress, frustration, poor eating habits, anxiety about occupational issues    Objective:    Mental Status:    Appearance: Appears stated age and Well-kept   Sensorium: alert   Affect:  consistent with expectations based upon mood   Mood:   Stressed, anxious   Thought Process:  Goal-Directed   Thought Content: no evidence of impairment   Behavior:   Cooperative   Psychomotor: Tense   Speech:   appropriate quality, quantity and organization of sentences   Orientation:  oriented to person, place, time, and general circumstances   Judgment & Insight:  normal insight and judgment   Memory: Intact         Assessment:   Progress/response to treatment:   Since initiation of treatment: Established academic writing plan.  Submitted a book chapter publication. Using system for creating weekly plan that includes all professional and personal tasks in a color-coded system. She is successful with her time management plan to add at least 15 to 30 minutes to her preparations to leave the house.  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  Completed home improvement painting projects.  Organized home office space and basement.     Since last visit: Worsening of stress due to occupational issues.     Risk: Focused assessment deferred, prior evaluation yielded minimal suicidal/homicidal risk and there are no new circumstances to warrant reassessment.      Protective Factors: denial of impulses, hopeful for improved circumstances, future goals, and making progress in treatment    No diagnosis found.      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  Discussed how to focus on facts that she knows to be true

## 2024-01-09 NOTE — PSYCHOTHERAPY
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  There are several faculty position cuts coming due to programs being eliminated.  She has had to speak on behalf of of the faculty regarding these issues.  There have also been discussions about her program being changed, and she has been informed that it is likely it will be transitioned to an online program. One of her administrators disagreed with statements she made to the media. She fears retaliation due to her stances on Diversity, Equity & Inclusion (DEI); although has no evidence this will be the case.  Discussed how to avoid making assumptions; but to still protect herself and have plans for her own employment (ie., adapting to online program).

## 2024-01-22 ENCOUNTER — OFFICE VISIT (OUTPATIENT)
Dept: PSYCHOLOGY | Age: 39
End: 2024-01-22
Payer: COMMERCIAL

## 2024-01-22 DIAGNOSIS — F33.41 MAJOR DEPRESSIVE DISORDER, RECURRENT EPISODE, IN PARTIAL REMISSION WITH ANXIOUS DISTRESS (HCC): Primary | ICD-10-CM

## 2024-01-22 PROCEDURE — 90834 PSYTX W PT 45 MINUTES: CPT | Performed by: PSYCHOLOGIST

## 2024-01-22 PROCEDURE — 1036F TOBACCO NON-USER: CPT | Performed by: PSYCHOLOGIST

## 2024-01-22 NOTE — PATIENT INSTRUCTIONS
Constructive Worry Log.  Write down top issues that are likely to weigh on your mind when you get into bed.  Think of 1-2 short term steps you will do to address them within the next few days.   Wind down routine at night before bed. Cut off electronics at least 30 mins before bed. Do crossword puzzle on paper outside of bedroom.   Try to keep the same sleep-wake schedule everyday (1:30am-9:30am).  Try not to deviate more than 1 hr on weekends.  No electronics in the bedroom.

## 2024-01-23 NOTE — PROGRESS NOTES
Behavioral Health Follow-Up   [facility]    Time Start: 2:30 p.m.  Time End:  3:15 p.m.  Date of Service:  1/22/2024  Session #: 13    Subjective:  Occupational stressors.    Symptoms reported: Stress, frustration, anxiety about occupational issues    Objective:    Mental Status:    Appearance: Appears stated age and Well-kept   Sensorium: alert   Affect:  consistent with expectations based upon mood   Mood:   Stressed, anxious   Thought Process:  Goal-Directed   Thought Content: no evidence of impairment   Behavior:   Cooperative   Psychomotor: Tense   Speech:   appropriate quality, quantity and organization of sentences   Orientation:  oriented to person, place, time, and general circumstances   Judgment & Insight:  normal insight and judgment   Memory: Intact         Assessment:   Progress/response to treatment:   Since initiation of treatment: Established academic writing plan.  Submitted a book chapter publication. Using system for creating weekly plan that includes all professional and personal tasks in a color-coded system. She is successful with her time management plan to add at least 15 to 30 minutes to her preparations to leave the house.  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  Completed home improvement painting projects.  Organized home office space and basement.     Since last visit: Worsening of stress due to occupational issues. Back on track with eating goals.  Using portion control.  Incorporating more consistent lunch.    Risk: Focused assessment deferred, prior evaluation yielded minimal suicidal/homicidal risk and there are no new circumstances to warrant reassessment.      Protective Factors: denial of impulses, hopeful for improved circumstances, future goals, and making progress in treatment      ICD-10-CM    1. Major depressive disorder, recurrent episode, in partial remission with anxious distress ([facility])  F33.41

## 2024-02-05 ENCOUNTER — OFFICE VISIT (OUTPATIENT)
Dept: PSYCHOLOGY | Age: 39
End: 2024-02-05
Payer: COMMERCIAL

## 2024-02-05 DIAGNOSIS — F33.41 MAJOR DEPRESSIVE DISORDER, RECURRENT EPISODE, IN PARTIAL REMISSION WITH ANXIOUS DISTRESS (HCC): Primary | ICD-10-CM

## 2024-02-05 PROCEDURE — 1036F TOBACCO NON-USER: CPT | Performed by: PSYCHOLOGIST

## 2024-02-05 PROCEDURE — 90832 PSYTX W PT 30 MINUTES: CPT | Performed by: PSYCHOLOGIST

## 2024-02-05 NOTE — PROGRESS NOTES
Behavioral Health Follow-Up   [facility]    Time Start: 2:40 p.m.  Time End:  3:10 p.m.  Date of Service:  2/5/2024  Session #: 14    Subjective:  Occupational stressors.    Symptoms reported: Stress, frustration, anxiety about occupational issues    Objective:    Mental Status:    Appearance: Appears stated age and Well-kept   Sensorium: alert   Affect:  consistent with expectations based upon mood   Mood:   Stressed, anxious   Thought Process:  Goal-Directed   Thought Content: no evidence of impairment   Behavior:   Cooperative   Psychomotor: Tense   Speech:   appropriate quality, quantity and organization of sentences   Orientation:  oriented to person, place, time, and general circumstances   Judgment & Insight:  normal insight and judgment   Memory: Intact         Assessment:   Progress/response to treatment:   Since initiation of treatment: Established academic writing plan.  Submitted a book chapter publication. Using system for creating weekly plan that includes all professional and personal tasks in a color-coded system. She is successful with her time management plan to add at least 15 to 30 minutes to her preparations to leave the house.  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  Completed home improvement painting projects.  Organized home office space and basement. Improvements with eating goals (e.g., meal planning; eating lunch).    Since last visit: Continued occupational stressors; however managing demands better.    Risk: Focused assessment deferred, prior evaluation yielded minimal suicidal/homicidal risk and there are no new circumstances to warrant reassessment.      Protective Factors: denial of impulses, hopeful for improved circumstances, future goals, and making progress in treatment      ICD-10-CM    1. Major depressive disorder, recurrent episode, in partial remission with anxious distress ([facility])  F33.41

## 2024-02-09 ENCOUNTER — HOSPITAL ENCOUNTER (OUTPATIENT)
Age: 39
Discharge: HOME | End: 2024-02-09
Payer: COMMERCIAL

## 2024-02-09 DIAGNOSIS — E03.9 ACQUIRED HYPOTHYROIDISM: ICD-10-CM

## 2024-02-09 LAB — TSH SERPL DL<=0.05 MIU/L-ACNC: 1.61 UIU/ML (ref 0.27–4.2)

## 2024-02-09 PROCEDURE — 84443 ASSAY THYROID STIM HORMONE: CPT

## 2024-02-09 PROCEDURE — 36415 COLL VENOUS BLD VENIPUNCTURE: CPT

## 2024-02-12 DIAGNOSIS — E03.9 ACQUIRED HYPOTHYROIDISM: ICD-10-CM

## 2024-02-12 RX ORDER — LEVOTHYROXINE SODIUM 0.1 MG/1
100 TABLET ORAL DAILY
Qty: 90 TABLET | Refills: 3 | OUTPATIENT
Start: 2024-02-12

## 2024-02-14 DIAGNOSIS — E03.9 ACQUIRED HYPOTHYROIDISM: ICD-10-CM

## 2024-02-14 RX ORDER — LEVOTHYROXINE SODIUM 112 UG/1
112 TABLET ORAL DAILY
Qty: 90 TABLET | Refills: 0 | Status: SHIPPED | OUTPATIENT
Start: 2024-02-14

## 2024-02-14 NOTE — TELEPHONE ENCOUNTER
Medication Refill Request    LOV 12/13/2023  NOV 3/27/2024    Lab Results   Component Value Date    CREATININE 0.9 12/13/2023

## 2024-02-19 ENCOUNTER — OFFICE VISIT (OUTPATIENT)
Dept: PSYCHOLOGY | Age: 39
End: 2024-02-19
Payer: COMMERCIAL

## 2024-02-19 DIAGNOSIS — F33.41 MAJOR DEPRESSIVE DISORDER, RECURRENT EPISODE, IN PARTIAL REMISSION WITH ANXIOUS DISTRESS (HCC): Primary | ICD-10-CM

## 2024-02-19 PROCEDURE — 1036F TOBACCO NON-USER: CPT | Performed by: PSYCHOLOGIST

## 2024-02-19 PROCEDURE — 90834 PSYTX W PT 45 MINUTES: CPT | Performed by: PSYCHOLOGIST

## 2024-02-19 NOTE — PROGRESS NOTES
Behavioral Health Follow-Up   [facility]    Time Start: 1:05 p.m.  Time End:  1:45 p.m.  Date of Service:  2/19/2024  Session #: 15    Subjective:  Occupational stressors.    Symptoms reported: Stress, frustration, anxiety about occupational issues    Objective:    Mental Status:    Appearance: Appears stated age and Well-kept   Sensorium: alert   Affect:  consistent with expectations based upon mood   Mood:   Stressed, improving   Thought Process:  Goal-Directed   Thought Content: no evidence of impairment   Behavior:   Cooperative   Psychomotor: Tense   Speech:   appropriate quality, quantity and organization of sentences   Orientation:  oriented to person, place, time, and general circumstances   Judgment & Insight:  normal insight and judgment   Memory: Intact         Assessment:   Progress/response to treatment:   Since initiation of treatment: Established academic writing plan.  Submitted a book chapter publication. Using system for creating weekly plan that includes all professional and personal tasks in a color-coded system. She is successful with her time management plan to add at least 15 to 30 minutes to her preparations to leave the house.  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  Completed home improvement painting projects.  Organized home office space and basement. Improvements with eating goals (e.g., meal planning; eating lunch).     Since last visit: Occupational stressors have improved.  Made progress with academic writing plan.     Risk: Focused assessment deferred, prior evaluation yielded minimal suicidal/homicidal risk and there are no new circumstances to warrant reassessment.      Protective Factors: denial of impulses, hopeful for improved circumstances, future goals, and making progress in treatment      ICD-10-CM    1. Major depressive disorder, recurrent episode, in partial remission with anxious distress ([facility])  F33.41

## 2024-02-26 ENCOUNTER — OFFICE VISIT (OUTPATIENT)
Dept: FAMILY MEDICINE CLINIC | Age: 39
End: 2024-02-26
Payer: COMMERCIAL

## 2024-02-26 VITALS
RESPIRATION RATE: 16 BRPM | HEART RATE: 76 BPM | BODY MASS INDEX: 34.33 KG/M2 | DIASTOLIC BLOOD PRESSURE: 58 MMHG | SYSTOLIC BLOOD PRESSURE: 100 MMHG | WEIGHT: 200 LBS | TEMPERATURE: 97.7 F | OXYGEN SATURATION: 99 %

## 2024-02-26 DIAGNOSIS — J01.90 ACUTE BACTERIAL SINUSITIS: Primary | ICD-10-CM

## 2024-02-26 DIAGNOSIS — B96.89 ACUTE BACTERIAL SINUSITIS: Primary | ICD-10-CM

## 2024-02-26 LAB
INFLUENZA A ANTIBODY: NORMAL
INFLUENZA B ANTIBODY: NORMAL
Lab: NORMAL
PERFORMING INSTRUMENT: NORMAL
QC PASS/FAIL: NORMAL
SARS-COV-2, POC: NORMAL

## 2024-02-26 PROCEDURE — G8482 FLU IMMUNIZE ORDER/ADMIN: HCPCS | Performed by: PHYSICIAN ASSISTANT

## 2024-02-26 PROCEDURE — 1036F TOBACCO NON-USER: CPT | Performed by: PHYSICIAN ASSISTANT

## 2024-02-26 PROCEDURE — 87804 INFLUENZA ASSAY W/OPTIC: CPT | Performed by: PHYSICIAN ASSISTANT

## 2024-02-26 PROCEDURE — 87426 SARSCOV CORONAVIRUS AG IA: CPT | Performed by: PHYSICIAN ASSISTANT

## 2024-02-26 PROCEDURE — G8417 CALC BMI ABV UP PARAM F/U: HCPCS | Performed by: PHYSICIAN ASSISTANT

## 2024-02-26 PROCEDURE — 99213 OFFICE O/P EST LOW 20 MIN: CPT | Performed by: PHYSICIAN ASSISTANT

## 2024-02-26 PROCEDURE — G8427 DOCREV CUR MEDS BY ELIG CLIN: HCPCS | Performed by: PHYSICIAN ASSISTANT

## 2024-02-26 RX ORDER — AZITHROMYCIN 250 MG/1
TABLET, FILM COATED ORAL
Qty: 6 TABLET | Refills: 0 | Status: SHIPPED | OUTPATIENT
Start: 2024-02-26 | End: 2024-03-07

## 2024-02-26 ASSESSMENT — PATIENT HEALTH QUESTIONNAIRE - PHQ9
SUM OF ALL RESPONSES TO PHQ QUESTIONS 1-9: 0
7. TROUBLE CONCENTRATING ON THINGS, SUCH AS READING THE NEWSPAPER OR WATCHING TELEVISION: NOT AT ALL
9. THOUGHTS THAT YOU WOULD BE BETTER OFF DEAD, OR OF HURTING YOURSELF: NOT AT ALL
2. FEELING DOWN, DEPRESSED OR HOPELESS: 0
8. MOVING OR SPEAKING SO SLOWLY THAT OTHER PEOPLE COULD HAVE NOTICED. OR THE OPPOSITE, BEING SO FIGETY OR RESTLESS THAT YOU HAVE BEEN MOVING AROUND A LOT MORE THAN USUAL: 0
3. TROUBLE FALLING OR STAYING ASLEEP: 0
8. MOVING OR SPEAKING SO SLOWLY THAT OTHER PEOPLE COULD HAVE NOTICED. OR THE OPPOSITE - BEING SO FIDGETY OR RESTLESS THAT YOU HAVE BEEN MOVING AROUND A LOT MORE THAN USUAL: NOT AT ALL
4. FEELING TIRED OR HAVING LITTLE ENERGY: NOT AT ALL
SUM OF ALL RESPONSES TO PHQ QUESTIONS 1-9: 0
5. POOR APPETITE OR OVEREATING: 0
10. IF YOU CHECKED OFF ANY PROBLEMS, HOW DIFFICULT HAVE THESE PROBLEMS MADE IT FOR YOU TO DO YOUR WORK, TAKE CARE OF THINGS AT HOME, OR GET ALONG WITH OTHER PEOPLE: 0
7. TROUBLE CONCENTRATING ON THINGS, SUCH AS READING THE NEWSPAPER OR WATCHING TELEVISION: 0
5. POOR APPETITE OR OVEREATING: NOT AT ALL
SUM OF ALL RESPONSES TO PHQ QUESTIONS 1-9: 0
3. TROUBLE FALLING OR STAYING ASLEEP: NOT AT ALL
10. IF YOU CHECKED OFF ANY PROBLEMS, HOW DIFFICULT HAVE THESE PROBLEMS MADE IT FOR YOU TO DO YOUR WORK, TAKE CARE OF THINGS AT HOME, OR GET ALONG WITH OTHER PEOPLE: NOT DIFFICULT AT ALL
1. LITTLE INTEREST OR PLEASURE IN DOING THINGS: 0
6. FEELING BAD ABOUT YOURSELF - OR THAT YOU ARE A FAILURE OR HAVE LET YOURSELF OR YOUR FAMILY DOWN: 0
1. LITTLE INTEREST OR PLEASURE IN DOING THINGS: NOT AT ALL
6. FEELING BAD ABOUT YOURSELF - OR THAT YOU ARE A FAILURE OR HAVE LET YOURSELF OR YOUR FAMILY DOWN: NOT AT ALL
4. FEELING TIRED OR HAVING LITTLE ENERGY: 0
2. FEELING DOWN, DEPRESSED OR HOPELESS: NOT AT ALL
SUM OF ALL RESPONSES TO PHQ9 QUESTIONS 1 & 2: 0
9. THOUGHTS THAT YOU WOULD BE BETTER OFF DEAD, OR OF HURTING YOURSELF: 0

## 2024-02-26 NOTE — PROGRESS NOTES
tonsillar hypertrophy or exudate.   Neck:  Normal ROM. Supple. No anterior cervical adenopathy noted.  Lungs: CTAB without wheezes, rales, or rhonchi.   CV:  Regular rate and rhythm, normal heart sounds, without pathological murmurs, ectopy, gallops, or rubs.  Skin:  Normal turgor.  Warm, dry, without visible rash.  Lymphatic: No lymphangitis or adenopathy noted.  Neurological:  Oriented.  Motor functions intact.    Lab / Imaging Results   (All laboratory and radiology results have been personally reviewed by myself)  Labs:  No results found for this visit on 02/26/24.    Imaging:  All Radiology results interpreted by Radiologist unless otherwise noted.  No results found.    Medical Decision Making   Pt non-toxic, in no apparent distress and stable at time of discharge.     Assessment/Plan   Livier was seen today for sinus problem.    Diagnoses and all orders for this visit:    Acute bacterial sinusitis  -     POCT Influenza A/B  -     POCT COVID-19, Antigen  -     azithromycin (ZITHROMAX) 250 MG tablet; 500mg on day 1 followed by 250mg on days 2 - 5    Rapid COVID and influenza negative. Script written for zpak, side effects discussed. Increase fluids and rest. Symptomatic relief discussed including Tylenol prn pain/fever. Schedule f/u with PCP in 7-10 days if symptoms persist. To call or to ED sooner if symptoms worsen or change. ED immediately with high or refractory fever, progressive SOB, dyspnea, CP, calf pain/swelling, shaking chills, vomiting, abdominal pain, lethargy, flank pain, or decreased urinary output. Pt verbalizes understanding and is in agreement with plan of care. All questions answered.    Rachell Ace PA-C

## 2024-03-18 ENCOUNTER — OFFICE VISIT (OUTPATIENT)
Dept: PSYCHOLOGY | Age: 39
End: 2024-03-18
Payer: COMMERCIAL

## 2024-03-18 DIAGNOSIS — F33.41 MAJOR DEPRESSIVE DISORDER, RECURRENT EPISODE, IN PARTIAL REMISSION WITH ANXIOUS DISTRESS (HCC): Primary | ICD-10-CM

## 2024-03-18 PROCEDURE — 1036F TOBACCO NON-USER: CPT | Performed by: PSYCHOLOGIST

## 2024-03-18 PROCEDURE — 90834 PSYTX W PT 45 MINUTES: CPT | Performed by: PSYCHOLOGIST

## 2024-03-18 NOTE — PROGRESS NOTES
Behavioral Health Follow-Up   [facility]    Time Start: 1:00 p.m.  Time End:  1:45 p.m.  Date of Service:  3/18/2024  Session #: 16    Subjective:  Occupational stressors have improved, although there are likely to be major changes in the next few years with her position. Concerns about her mother-in-law.    Symptoms reported: Stress    Objective:    Mental Status:    Appearance: Appears stated age and Well-kept   Sensorium: alert   Affect:  consistent with expectations based upon mood   Mood:   Stressed, improving   Thought Process:  Goal-Directed   Thought Content: no evidence of impairment   Behavior:   Cooperative   Psychomotor: Relaxed   Speech:   appropriate quality, quantity and organization of sentences   Orientation:  oriented to person, place, time, and general circumstances   Judgment & Insight:  normal insight and judgment   Memory: Intact         Assessment:   Progress/response to treatment:   Since initiation of treatment: Established academic writing plan.  Submitted a book chapter publication. Using system for creating weekly plan that includes all professional and personal tasks in a color-coded system. She is successful with her time management plan to add at least 15 to 30 minutes to her preparations to leave the house.  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  Completed home improvement painting projects.  Organized home office space and basement. Improvements with eating goals (e.g., meal planning; eating lunch). Occupational stressors have improved.  Made progress with academic writing plan.     Since last visit: Has resumed meal planning, feels on track with eating goals.  Submitted extended abstract for publication.  Has all the necessary publication opportunities set up to meet requirements for her next bi-annual review in Spring 2025.    Risk: Focused assessment deferred, prior evaluation yielded minimal suicidal/homicidal risk

## 2024-03-20 ENCOUNTER — HOSPITAL ENCOUNTER (OUTPATIENT)
Age: 39
Discharge: HOME | End: 2024-03-20
Payer: COMMERCIAL

## 2024-03-20 DIAGNOSIS — E03.9 ACQUIRED HYPOTHYROIDISM: ICD-10-CM

## 2024-03-20 DIAGNOSIS — Z98.84 S/P LAPAROSCOPIC SLEEVE GASTRECTOMY: ICD-10-CM

## 2024-03-20 LAB
25(OH)D3 SERPL-MCNC: 62.8 NG/ML (ref 30–100)
ALBUMIN SERPL-MCNC: 4.5 G/DL (ref 3.5–5.2)
ALP SERPL-CCNC: 102 U/L (ref 35–104)
ALT SERPL-CCNC: 33 U/L (ref 0–32)
ANION GAP SERPL CALCULATED.3IONS-SCNC: 9 MMOL/L (ref 7–16)
AST SERPL-CCNC: 31 U/L (ref 0–31)
BASOPHILS # BLD: 0.04 K/UL (ref 0–0.2)
BASOPHILS NFR BLD: 1 % (ref 0–2)
BILIRUB SERPL-MCNC: 0.4 MG/DL (ref 0–1.2)
BUN SERPL-MCNC: 15 MG/DL (ref 6–20)
CALCIUM SERPL-MCNC: 9.7 MG/DL (ref 8.6–10.2)
CHLORIDE SERPL-SCNC: 101 MMOL/L (ref 98–107)
CO2 SERPL-SCNC: 29 MMOL/L (ref 22–29)
CREAT SERPL-MCNC: 0.9 MG/DL (ref 0.5–1)
EOSINOPHIL # BLD: 0.02 K/UL (ref 0.05–0.5)
EOSINOPHILS RELATIVE PERCENT: 0 % (ref 0–6)
ERYTHROCYTE [DISTWIDTH] IN BLOOD BY AUTOMATED COUNT: 12.3 % (ref 11.5–15)
FERRITIN SERPL-MCNC: 134 NG/ML
FOLATE SERPL-MCNC: >20 NG/ML (ref 4.8–24.2)
GFR SERPL CREATININE-BSD FRML MDRD: >60 ML/MIN/1.73M2
GLUCOSE SERPL-MCNC: 87 MG/DL (ref 74–99)
HCT VFR BLD AUTO: 44.5 % (ref 34–48)
HGB BLD-MCNC: 15 G/DL (ref 11.5–15.5)
IMM GRANULOCYTES # BLD AUTO: <0.03 K/UL (ref 0–0.58)
IMM GRANULOCYTES NFR BLD: 0 % (ref 0–5)
IRON SATN MFR SERPL: 37 % (ref 15–50)
IRON SERPL-MCNC: 110 UG/DL (ref 37–145)
LYMPHOCYTES NFR BLD: 1.37 K/UL (ref 1.5–4)
LYMPHOCYTES RELATIVE PERCENT: 24 % (ref 20–42)
MAGNESIUM SERPL-MCNC: 2.3 MG/DL (ref 1.6–2.6)
MCH RBC QN AUTO: 31.2 PG (ref 26–35)
MCHC RBC AUTO-ENTMCNC: 33.7 G/DL (ref 32–34.5)
MCV RBC AUTO: 92.5 FL (ref 80–99.9)
MONOCYTES NFR BLD: 0.43 K/UL (ref 0.1–0.95)
MONOCYTES NFR BLD: 8 % (ref 2–12)
NEUTROPHILS NFR BLD: 67 % (ref 43–80)
NEUTS SEG NFR BLD: 3.82 K/UL (ref 1.8–7.3)
PHOSPHATE SERPL-MCNC: 2.8 MG/DL (ref 2.5–4.5)
PLATELET # BLD AUTO: 254 K/UL (ref 130–450)
PMV BLD AUTO: 9.4 FL (ref 7–12)
POTASSIUM SERPL-SCNC: 4.4 MMOL/L (ref 3.5–5)
PROT SERPL-MCNC: 7.5 G/DL (ref 6.4–8.3)
PTH-INTACT SERPL-MCNC: 43.2 PG/ML (ref 15–65)
RBC # BLD AUTO: 4.81 M/UL (ref 3.5–5.5)
SODIUM SERPL-SCNC: 139 MMOL/L (ref 132–146)
TIBC SERPL-MCNC: 299 UG/DL (ref 250–450)
TSH SERPL DL<=0.05 MIU/L-ACNC: 1.72 UIU/ML (ref 0.27–4.2)
URATE SERPL-MCNC: 4.2 MG/DL (ref 2.4–5.7)
VIT B12 SERPL-MCNC: 1224 PG/ML (ref 211–946)
WBC OTHER # BLD: 5.7 K/UL (ref 4.5–11.5)

## 2024-03-20 PROCEDURE — 83970 ASSAY OF PARATHORMONE: CPT

## 2024-03-20 PROCEDURE — 84207 ASSAY OF VITAMIN B-6: CPT

## 2024-03-20 PROCEDURE — 84425 ASSAY OF VITAMIN B-1: CPT

## 2024-03-20 PROCEDURE — 82306 VITAMIN D 25 HYDROXY: CPT

## 2024-03-20 PROCEDURE — 85025 COMPLETE CBC W/AUTO DIFF WBC: CPT

## 2024-03-20 PROCEDURE — 80053 COMPREHEN METABOLIC PANEL: CPT

## 2024-03-20 PROCEDURE — 84597 ASSAY OF VITAMIN K: CPT

## 2024-03-20 PROCEDURE — 82728 ASSAY OF FERRITIN: CPT

## 2024-03-20 PROCEDURE — 83550 IRON BINDING TEST: CPT

## 2024-03-20 PROCEDURE — 82746 ASSAY OF FOLIC ACID SERUM: CPT

## 2024-03-20 PROCEDURE — 83735 ASSAY OF MAGNESIUM: CPT

## 2024-03-20 PROCEDURE — 84630 ASSAY OF ZINC: CPT

## 2024-03-20 PROCEDURE — 82607 VITAMIN B-12: CPT

## 2024-03-20 PROCEDURE — 84100 ASSAY OF PHOSPHORUS: CPT

## 2024-03-20 PROCEDURE — 36415 COLL VENOUS BLD VENIPUNCTURE: CPT

## 2024-03-20 PROCEDURE — 83540 ASSAY OF IRON: CPT

## 2024-03-20 PROCEDURE — 84550 ASSAY OF BLOOD/URIC ACID: CPT

## 2024-03-20 PROCEDURE — 84443 ASSAY THYROID STIM HORMONE: CPT

## 2024-03-23 LAB — ZINC SERPL-MCNC: 79.7 UG/DL (ref 60–120)

## 2024-03-25 LAB — PHYTONADIONE SERPL-MCNC: 0.35 NMOL/L (ref 0.22–4.88)

## 2024-03-26 LAB
PYRIDOXAL PHOS SERPL-SCNC: 56.9 NMOL/L (ref 20–125)
VIT B1 PYROPHOSHATE BLD-SCNC: 133 NMOL/L (ref 70–180)

## 2024-03-27 ENCOUNTER — OFFICE VISIT (OUTPATIENT)
Dept: FAMILY MEDICINE CLINIC | Age: 39
End: 2024-03-27
Payer: COMMERCIAL

## 2024-03-27 VITALS
WEIGHT: 203 LBS | BODY MASS INDEX: 34.66 KG/M2 | SYSTOLIC BLOOD PRESSURE: 112 MMHG | OXYGEN SATURATION: 99 % | HEIGHT: 64 IN | DIASTOLIC BLOOD PRESSURE: 62 MMHG | HEART RATE: 85 BPM | TEMPERATURE: 97.3 F

## 2024-03-27 DIAGNOSIS — E78.5 HYPERLIPIDEMIA, UNSPECIFIED HYPERLIPIDEMIA TYPE: Primary | ICD-10-CM

## 2024-03-27 DIAGNOSIS — J45.990 EXERCISE-INDUCED ASTHMA: ICD-10-CM

## 2024-03-27 DIAGNOSIS — E03.9 ACQUIRED HYPOTHYROIDISM: ICD-10-CM

## 2024-03-27 PROCEDURE — G8427 DOCREV CUR MEDS BY ELIG CLIN: HCPCS | Performed by: FAMILY MEDICINE

## 2024-03-27 PROCEDURE — 99214 OFFICE O/P EST MOD 30 MIN: CPT | Performed by: FAMILY MEDICINE

## 2024-03-27 PROCEDURE — G8417 CALC BMI ABV UP PARAM F/U: HCPCS | Performed by: FAMILY MEDICINE

## 2024-03-27 PROCEDURE — G8482 FLU IMMUNIZE ORDER/ADMIN: HCPCS | Performed by: FAMILY MEDICINE

## 2024-03-27 PROCEDURE — 1036F TOBACCO NON-USER: CPT | Performed by: FAMILY MEDICINE

## 2024-03-27 RX ORDER — ALBUTEROL SULFATE 90 UG/1
2 AEROSOL, METERED RESPIRATORY (INHALATION) EVERY 6 HOURS PRN
Qty: 3 EACH | Refills: 3 | Status: SHIPPED | OUTPATIENT
Start: 2024-03-27

## 2024-03-27 RX ORDER — LEVOTHYROXINE SODIUM 112 UG/1
112 TABLET ORAL DAILY
Qty: 90 TABLET | Refills: 1 | Status: SHIPPED | OUTPATIENT
Start: 2024-03-27

## 2024-03-27 NOTE — PROGRESS NOTES
Lipid Panel      2. Exercise-induced asthma  albuterol sulfate HFA (PROVENTIL;VENTOLIN;PROAIR) 108 (90 Base) MCG/ACT inhaler      3. Acquired hypothyroidism  levothyroxine (SYNTHROID) 112 MCG tablet    TSH            Continue synthroid 100mcg  Continue to wean effexor to effective dose as able, slowly  continue to work on diet and exercise  alternate flonase, azelastine every other day as per previous  Future labs as ordered      RTO: Return in about 4 months (around 7/27/2024) for chronic disease / routine f/u.        An electronic signature was used to authenticate this note.  ---- Whitley Jeronimo MD on 3/27/2024 at 4:11 PM

## 2024-04-15 ENCOUNTER — OFFICE VISIT (OUTPATIENT)
Dept: PSYCHOLOGY | Age: 39
End: 2024-04-15
Payer: COMMERCIAL

## 2024-04-15 DIAGNOSIS — F33.41 MAJOR DEPRESSIVE DISORDER, RECURRENT EPISODE, IN PARTIAL REMISSION WITH ANXIOUS DISTRESS (HCC): Primary | ICD-10-CM

## 2024-04-15 PROCEDURE — 90834 PSYTX W PT 45 MINUTES: CPT | Performed by: PSYCHOLOGIST

## 2024-04-15 PROCEDURE — 1036F TOBACCO NON-USER: CPT | Performed by: PSYCHOLOGIST

## 2024-04-15 NOTE — PROGRESS NOTES
job loss and how she can help him to problem solve a solution.  He has a degree in political science and has always hoped to work in political campaigning, however this career path has not materialized for him.  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  Discussed how she can support him and problem-solving but also that it is reasonable to be assertive about him finding a reliable long-term job.    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  It is unclear how this will affect her job.  Processed her reactions. Discussed how it is best at this time to focus on what she has in her control, such as completing her writing projects and working with her  on a long-term solution for him occupationally. Discussed how to manage related fears.    She is doing well with her exercise goals and will be doing several races in June.  She has exercise-induced asthma and has some difficulty with panic if she feels short of breath.  Discussed how to use cognitive coping statements to prevent panic.      Treatment goal(s) addressed: #1 below    Plan:  Homework/recommendations: See above    Follow up:  4 weeks, scheduled for 5/13/24    Electronically signed by Jess Dorantes, PhD    _____________________________________________________________________________________    TREATMENT PLAN:     Date created: 5/16/23  Date last updated: 9/11/23     Goal #1: Problem:  Mixed Depression & Anxiety  Description: PHQ-9 (intake) = 5 (mild depression). PRIYANKA-7 = 4 (subclinical anxiety). Symptoms:  insomnia (4-5 hours on week nights due to working on projects), fatigue, feeling nervous/on edge, worries about a number of stressors, difficulty relaxing, mild irritability, and stress eating. Procrastination.  Goal:  To decrease the frequency and severity of anxiety and depression symptoms. To resume a healthy diet

## 2024-05-13 ENCOUNTER — OFFICE VISIT (OUTPATIENT)
Dept: PSYCHOLOGY | Age: 39
End: 2024-05-13
Payer: COMMERCIAL

## 2024-05-13 DIAGNOSIS — F33.41 MAJOR DEPRESSIVE DISORDER, RECURRENT EPISODE, IN PARTIAL REMISSION WITH ANXIOUS DISTRESS (HCC): Primary | ICD-10-CM

## 2024-05-13 PROCEDURE — 1036F TOBACCO NON-USER: CPT | Performed by: PSYCHOLOGIST

## 2024-05-13 PROCEDURE — 90834 PSYTX W PT 45 MINUTES: CPT | Performed by: PSYCHOLOGIST

## 2024-05-13 NOTE — PROGRESS NOTES
healthy diet and eating plan.   Progress indicators: Decreased PHQ9 and GAD7 score. 1) Develop skills for getting the most fulfilling results out of time and effort on projects. 2) Develop tools to learn how to not get over-involved and over-commited.  (Setting boundaries. To learn to say no politely.)  3) Publish at least one academic paper in the next 2 years. Improve eating plan by prioritizing protein. Consistently utilize meal planing. Sticking with macronutrient goals.     Timeline:       Mild Progress Moderate Progress Goal Met      Description: To have created a schedule and/or organizational plan that can be consistently implemented. Also incorporate meal plan (e.g., protein, carbs, and fat targets).     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      To develop personal philosophy on how to decline involvement in service roles when invited and/or how to quit roles that are no longer bringing valuable results (over commitment).     Track eating - keep a food diary.     10% reduction on PHQ-9 and PRIYANKA-7 Maintaining organizational plan.     To avoid taking on any new service responsibilities.      To avoid volunteering for [employer] elections role.         20% reduction on PHQ-9 and PRIYANKA-7 Able to adapt organizational plan when necessary.      Only performing service roles that are valuable uses of time.      Article ready for publication submission (with the goal ultimately of publishing every 6 months).      To eliminate stress eating.         30% reduction on PHQ-9 and PRIYANKA-7   Target time frame: 1-2 months or ~ 3-5 visits    2-3 months or ~ 4-6 visits 3-6 months or ~ 6-8 visits   Notes on progress:  As of 7/31/23 -  Has developed color-coded organizational system, will need to wait until Fall semester begins to fully implement.  Completed home office organization.  Has declined some responsibilities with [employer], and also

## 2024-05-21 ENCOUNTER — OFFICE VISIT (OUTPATIENT)
Dept: FAMILY MEDICINE CLINIC | Age: 39
End: 2024-05-21
Payer: COMMERCIAL

## 2024-05-21 VITALS
HEART RATE: 91 BPM | WEIGHT: 202.4 LBS | SYSTOLIC BLOOD PRESSURE: 112 MMHG | TEMPERATURE: 97.3 F | OXYGEN SATURATION: 99 % | BODY MASS INDEX: 34.56 KG/M2 | HEIGHT: 64 IN | DIASTOLIC BLOOD PRESSURE: 60 MMHG

## 2024-05-21 DIAGNOSIS — J45.990 EXERCISE-INDUCED ASTHMA: Primary | ICD-10-CM

## 2024-05-21 DIAGNOSIS — J45.901 MODERATE ASTHMA WITH EXACERBATION, UNSPECIFIED WHETHER PERSISTENT: ICD-10-CM

## 2024-05-21 PROCEDURE — 99214 OFFICE O/P EST MOD 30 MIN: CPT | Performed by: FAMILY MEDICINE

## 2024-05-21 PROCEDURE — G8417 CALC BMI ABV UP PARAM F/U: HCPCS | Performed by: FAMILY MEDICINE

## 2024-05-21 PROCEDURE — 1036F TOBACCO NON-USER: CPT | Performed by: FAMILY MEDICINE

## 2024-05-21 PROCEDURE — G8427 DOCREV CUR MEDS BY ELIG CLIN: HCPCS | Performed by: FAMILY MEDICINE

## 2024-05-21 NOTE — PROGRESS NOTES
CC: Livier Feliz is a 39 y.o. yo female is here for evaluation evaluation for the following chronic medical concerns: Asthma (Had asthma attack after biking up hill)        HPI:      Asthma ex:  Known exercise induced asthma - on albuterol PRN; 2 days ago was training / biking; did use inhaler before workout; going up [address] and had asthma attack; started to breath heavier and anxiety took over as well; could not catch breath and then was coughing from the stuff that gets in her lungs;  never got to point that sputum was bloody but had taste of that in mouth; she noticed that this only occurred going up a steep hill and that her brake was rubbing against her tire and she had to work extra hard to go up the hill; she has her bike in the shop for tune up; she has some anxiety with riding due to this event; she had to pause for 30minutes but was able to resume training for 90 minutes after her break; the whole event improved with a break of 30 minutes, calming down and changing locations; she took frequent breaks; she did not use her inhaler during this episode just before her workout    Planning on competing for [place] for [employer]bike, run, swim[place] 8/2024 in [place]; she has an upcoming competition in June    Vitals:   /60 (Site: Left Upper Arm, Position: Sitting)   Pulse 91   Temp 97.3 °F (36.3 °C)   Ht 1.626 m (5' 4\")   Wt 91.8 kg (202 lb 6.4 oz)   SpO2 99%   BMI 34.74 kg/m²   Wt Readings from Last 3 Encounters:   05/21/24 91.8 kg (202 lb 6.4 oz)   03/27/24 92.1 kg (203 lb)   02/26/24 90.7 kg (200 lb)       PE:  Constitutional - alert, well appearing, and in no distress  Ears: wnl b/l  Eyes - extraocular eye movements intact, left eye normal, right eye normal, no conjunctivitis noted  Neck - symmetric, no obvious masses noted  Respiratory- clear to auscultation, no wheezes, rales or rhonchi, symmetric air entry; no increased work of breathing  Cardiovascular - normal rate, regular rhythm, normal

## 2024-06-17 ENCOUNTER — OFFICE VISIT (OUTPATIENT)
Dept: PSYCHOLOGY | Age: 39
End: 2024-06-17
Payer: COMMERCIAL

## 2024-06-17 DIAGNOSIS — F33.41 MAJOR DEPRESSIVE DISORDER, RECURRENT EPISODE, IN PARTIAL REMISSION WITH ANXIOUS DISTRESS (HCC): Primary | ICD-10-CM

## 2024-06-17 PROCEDURE — 1036F TOBACCO NON-USER: CPT | Performed by: PSYCHOLOGIST

## 2024-06-17 PROCEDURE — 90834 PSYTX W PT 45 MINUTES: CPT | Performed by: PSYCHOLOGIST

## 2024-06-18 NOTE — PROGRESS NOTES
Behavioral Health Follow-Up   [facility]    Time Start: 10:45 a.m.  Time End:  11:30 a.m.  Date of Service:  6/17/2024  Session #: 20    Subjective:  Completed triathlon events, some distress related to her performance.  Friction with her  secondary to financial issues/his underemployment and joint dietary/eating goals.    Symptoms reported: Stress    Objective:    Mental Status:    Appearance: Appears stated age and Well-kept   Sensorium: alert   Affect:  consistent with expectations based upon mood   Mood:   Stressed   Thought Process:  Goal-Directed   Thought Content: no evidence of impairment   Behavior:   Cooperative   Psychomotor: Relaxed   Speech:   appropriate quality, quantity and organization of sentences   Orientation:  oriented to person, place, time, and general circumstances   Judgment & Insight:  normal insight and judgment   Memory: Intact         Assessment:   Progress/response to treatment:   Since initiation of treatment: Improved occupational-related organization and time management. 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  Completed home improvement/organization projects. Improvements with eating goals (e.g., meal planning; eating lunch). Submitted extended abstract and book chapter for publication.  Has all the necessary publication opportunities set up to meet requirements for her next bi-annual review in Spring 2025.    Since last visit:  Improvements related to triathlon completion; and some progress with finances/her 's employment.     Risk: Focused assessment deferred, prior evaluation yielded minimal suicidal/homicidal risk and there are no new circumstances to warrant reassessment.      Protective Factors: denial of impulses, hopeful for improved circumstances, future goals, and making progress in treatment      ICD-10-CM    1. Major depressive disorder, recurrent episode, in partial remission with anxious

## 2024-07-23 ENCOUNTER — OFFICE VISIT (OUTPATIENT)
Dept: PSYCHOLOGY | Age: 39
End: 2024-07-23
Payer: COMMERCIAL

## 2024-07-23 DIAGNOSIS — F33.41 MAJOR DEPRESSIVE DISORDER, RECURRENT EPISODE, IN PARTIAL REMISSION WITH ANXIOUS DISTRESS (HCC): Primary | ICD-10-CM

## 2024-07-23 PROCEDURE — 1036F TOBACCO NON-USER: CPT | Performed by: PSYCHOLOGIST

## 2024-07-23 PROCEDURE — 90834 PSYTX W PT 45 MINUTES: CPT | Performed by: PSYCHOLOGIST

## 2024-07-23 NOTE — PROGRESS NOTES
Behavioral Health Follow-Up   [facility]    Time Start: 10:45 a.m.  Time End:  11:30 a.m.  Date of Service:  7/23/2024  Session #: 21    Subjective:  Reporting continued difficulty with prioritizing work demands/expectations (see below).  However has made other improvements in terms of working toward diet goals and addressing disagreements with her .      Symptoms reported: Procrastination, stress, feeling overwhelmed    Objective:    Mental Status:    Appearance: Appears stated age and Well-kept   Sensorium: alert   Affect:  consistent with expectations based upon mood   Mood:   Stressed   Thought Process:  Goal-Directed   Thought Content: no evidence of impairment   Behavior:   Cooperative   Psychomotor: Mildly tense   Speech:   appropriate quality, quantity and organization of sentences   Orientation:  oriented to person, place, time, and general circumstances   Judgment & Insight:  normal insight and judgment   Memory: Intact         Assessment:   Progress/response to treatment:   Since initiation of treatment: Improved occupational-related organization and time management. 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  Completed home improvement/organization projects. Improvements with eating goals (e.g., meal planning; eating lunch). Submitted extended abstract and book chapter for publication.  Has all the necessary publication opportunities set up to meet requirements for her next bi-annual review in Spring 2025.    Since last visit:  Her  has been employed, although at a temporary job through November.  Therefore improvements in their household finances.  They are also working together on eating/dietary change goals.     Risk: Focused assessment deferred, prior evaluation yielded minimal suicidal/homicidal risk and there are no new circumstances to warrant reassessment.      Protective Factors: denial of impulses, hopeful for improved

## 2024-07-24 ENCOUNTER — PATIENT MESSAGE (OUTPATIENT)
Dept: PSYCHOLOGY | Age: 39
End: 2024-07-24

## 2024-07-24 NOTE — TELEPHONE ENCOUNTER
From: Livier Feliz  To: Jess Dorantes, PhD  Sent: 7/24/2024 3:57 PM EDT  Subject: NP Psych Referral    Hi Dr. Dorantes,  [facility] called me this afternoon after receiving your referral. They said they are not accepting new patients. I pointed out that you referred me there and that it is for medication management and they repeated that they aren’t accepting new patients.     Just wanted to let you know.     Yokasta

## 2024-08-20 ENCOUNTER — OFFICE VISIT (OUTPATIENT)
Dept: PSYCHOLOGY | Age: 39
End: 2024-08-20
Payer: COMMERCIAL

## 2024-08-20 DIAGNOSIS — F33.41 MAJOR DEPRESSIVE DISORDER, RECURRENT EPISODE, IN PARTIAL REMISSION WITH ANXIOUS DISTRESS (HCC): Primary | ICD-10-CM

## 2024-08-20 PROCEDURE — 90834 PSYTX W PT 45 MINUTES: CPT | Performed by: PSYCHOLOGIST

## 2024-08-20 PROCEDURE — 1036F TOBACCO NON-USER: CPT | Performed by: PSYCHOLOGIST

## 2024-08-20 NOTE — PROGRESS NOTES
Behavioral Health Follow-Up   [facility]    Time Start: 1:00 pm  Time End:  1:45 pm  Date of Service:  8/20/2024  Session #: 22    Subjective:  Has made progress with organization goals to manage procrastination.  Achieving academic goals.    Symptoms reported: Procrastination, stress, feeling overwhelmed    Objective:    Mental Status:    Appearance: Appears stated age and Well-kept   Sensorium: alert   Affect:  consistent with expectations based upon mood   Mood:   Stressed   Thought Process:  Goal-Directed   Thought Content: no evidence of impairment   Behavior:   Cooperative   Psychomotor: Mildly tense   Speech:   appropriate quality, quantity and organization of sentences   Orientation:  oriented to person, place, time, and general circumstances   Judgment & Insight:  normal insight and judgment   Memory: Intact         Assessment:   Progress/response to treatment:   Since initiation of treatment: Improved occupational-related organization and time management. 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  Completed home improvement/organization projects. Improvements with eating goals (e.g., meal planning; eating lunch). Submitted extended abstract and book chapter for publication.  Has all the necessary publication opportunities set up to meet requirements for her next bi-annual review in Spring 2025. Her  has been employed, although at a temporary job through November.  Therefore improvements in their household finances.  They are also working together on eating/dietary change goals.     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 Starting fall semester next week, has improved organizational plan with calendar.     Risk: Focused assessment deferred, prior evaluation yielded minimal suicidal/homicidal risk and there are no new circumstances to warrant reassessment.      Protective

## 2024-08-20 NOTE — PATIENT INSTRUCTIONS
Utilize times of the day when your brain is the sharpest.     Reserve dedicated time weekly for office work, that focuses on larger, more cognitively taxing tasks, such as academic writing and course preparation (Tier 1).     Create a weekly schedule, and reserve blocks for particular tasks. Treating each block of the schedule as if you were clocking in for a job.   M:  1-5pm Tier 1 tasks on campus    T:  11-2pm teaching;          2pm-3:30 pm office hours - Tier 2 tasks     W: 12pm-1pm Tier 1 tasks on campus       1-4pm office hours - Tier 2 tasks     Th:  11-2pm teaching                      2pm-3:30 pm office hours     F:  Tier 3 tasks remotely    4. When looking at to do lists, do what you want to do least, first.     5. Commit to not reacting to \"pop up\" issues during Tier 1 time. It is not an emergency.  A 48-72 hr turn around time is still a very good response time. Turn off [employer] carson during Tier 1 time.

## 2024-09-23 ENCOUNTER — OFFICE VISIT (OUTPATIENT)
Dept: PSYCHOLOGY | Age: 39
End: 2024-09-23
Payer: COMMERCIAL

## 2024-09-23 DIAGNOSIS — F33.41 MAJOR DEPRESSIVE DISORDER, RECURRENT EPISODE, IN PARTIAL REMISSION WITH ANXIOUS DISTRESS (HCC): Primary | ICD-10-CM

## 2024-09-23 PROCEDURE — 90834 PSYTX W PT 45 MINUTES: CPT | Performed by: PSYCHOLOGIST

## 2024-09-23 PROCEDURE — 1036F TOBACCO NON-USER: CPT | Performed by: PSYCHOLOGIST

## 2024-09-25 DIAGNOSIS — E78.5 HYPERLIPIDEMIA, UNSPECIFIED HYPERLIPIDEMIA TYPE: ICD-10-CM

## 2024-09-25 DIAGNOSIS — E03.9 ACQUIRED HYPOTHYROIDISM: ICD-10-CM

## 2024-09-26 LAB
CHOLESTEROL, TOTAL: 194 MG/DL
HDLC SERPL-MCNC: 62 MG/DL
LDL CHOLESTEROL: 121 MG/DL
TRIGL SERPL-MCNC: 53 MG/DL
TSH SERPL DL<=0.05 MIU/L-ACNC: 1.55 UIU/ML (ref 0.27–4.2)
VLDLC SERPL CALC-MCNC: 11 MG/DL

## 2024-09-28 SDOH — ECONOMIC STABILITY: INCOME INSECURITY: HOW HARD IS IT FOR YOU TO PAY FOR THE VERY BASICS LIKE FOOD, HOUSING, MEDICAL CARE, AND HEATING?: NOT HARD AT ALL

## 2024-09-28 SDOH — ECONOMIC STABILITY: FOOD INSECURITY: WITHIN THE PAST 12 MONTHS, THE FOOD YOU BOUGHT JUST DIDN'T LAST AND YOU DIDN'T HAVE MONEY TO GET MORE.: NEVER TRUE

## 2024-09-28 SDOH — ECONOMIC STABILITY: FOOD INSECURITY: WITHIN THE PAST 12 MONTHS, YOU WORRIED THAT YOUR FOOD WOULD RUN OUT BEFORE YOU GOT MONEY TO BUY MORE.: NEVER TRUE

## 2024-10-01 ENCOUNTER — OFFICE VISIT (OUTPATIENT)
Dept: FAMILY MEDICINE CLINIC | Age: 39
End: 2024-10-01
Payer: COMMERCIAL

## 2024-10-01 VITALS
OXYGEN SATURATION: 99 % | SYSTOLIC BLOOD PRESSURE: 120 MMHG | HEART RATE: 94 BPM | WEIGHT: 201 LBS | DIASTOLIC BLOOD PRESSURE: 62 MMHG | TEMPERATURE: 97.5 F | HEIGHT: 63 IN | BODY MASS INDEX: 35.61 KG/M2

## 2024-10-01 DIAGNOSIS — J45.990 EXERCISE-INDUCED ASTHMA: ICD-10-CM

## 2024-10-01 DIAGNOSIS — E03.9 ACQUIRED HYPOTHYROIDISM: ICD-10-CM

## 2024-10-01 DIAGNOSIS — Z98.84 S/P LAPAROSCOPIC SLEEVE GASTRECTOMY: ICD-10-CM

## 2024-10-01 DIAGNOSIS — Z23 NEED FOR INFLUENZA VACCINATION: ICD-10-CM

## 2024-10-01 DIAGNOSIS — E78.5 HYPERLIPIDEMIA, UNSPECIFIED HYPERLIPIDEMIA TYPE: Primary | ICD-10-CM

## 2024-10-01 PROCEDURE — 1036F TOBACCO NON-USER: CPT | Performed by: FAMILY MEDICINE

## 2024-10-01 PROCEDURE — G8484 FLU IMMUNIZE NO ADMIN: HCPCS | Performed by: FAMILY MEDICINE

## 2024-10-01 PROCEDURE — 90661 CCIIV3 VAC ABX FR 0.5 ML IM: CPT | Performed by: FAMILY MEDICINE

## 2024-10-01 PROCEDURE — 99214 OFFICE O/P EST MOD 30 MIN: CPT | Performed by: FAMILY MEDICINE

## 2024-10-01 PROCEDURE — G8427 DOCREV CUR MEDS BY ELIG CLIN: HCPCS | Performed by: FAMILY MEDICINE

## 2024-10-01 PROCEDURE — 90471 IMMUNIZATION ADMIN: CPT | Performed by: FAMILY MEDICINE

## 2024-10-01 PROCEDURE — G8417 CALC BMI ABV UP PARAM F/U: HCPCS | Performed by: FAMILY MEDICINE

## 2024-10-01 RX ORDER — ALBUTEROL SULFATE 90 UG/1
2 INHALANT RESPIRATORY (INHALATION) EVERY 6 HOURS PRN
Qty: 3 EACH | Refills: 3 | Status: SHIPPED
Start: 2024-10-01 | End: 2024-10-01 | Stop reason: SDUPTHER

## 2024-10-01 RX ORDER — LORAZEPAM 0.5 MG/1
0.25 TABLET ORAL NIGHTLY PRN
COMMUNITY
Start: 2024-08-01

## 2024-10-01 RX ORDER — LEVOTHYROXINE SODIUM 112 UG/1
112 TABLET ORAL DAILY
Qty: 90 TABLET | Refills: 1 | Status: SHIPPED
Start: 2024-10-01 | End: 2024-10-01 | Stop reason: SDUPTHER

## 2024-10-01 RX ORDER — ALBUTEROL SULFATE 90 UG/1
2 INHALANT RESPIRATORY (INHALATION) EVERY 6 HOURS PRN
Qty: 3 EACH | Refills: 3 | Status: SHIPPED | OUTPATIENT
Start: 2024-10-01

## 2024-10-01 RX ORDER — LEVOTHYROXINE SODIUM 112 UG/1
112 TABLET ORAL DAILY
Qty: 90 TABLET | Refills: 1 | Status: SHIPPED | OUTPATIENT
Start: 2024-10-01

## 2024-10-01 NOTE — PROGRESS NOTES
CC: Livier Feliz is a 39 y.o. yo female is here for evaluation evaluation for the following chronic medical concerns: Hyperlipidemia, Hypothyroidism, and Diabetes        HPI:    Qualified to compete [place] in [place]    HLD - not on medical therapy  Hypothyroidism - on synthroid 100mcg  Asthma, ex induced: controlled on pre-exercise albuterol; last attack 5/2024 only one in about >1 year and not needing additional albuterol  Seasonal allergies - on xyzal; alternates flonase, astelin every other day  Anxiety and depression - hx of hospitalization 2005 for SI; currently controlled on wellubtrin and effexor (no xl due to hx of gastric sleeve); [facility]; previous attempt to decrease dose of effexor unsuccessful -- currently weaning due to high dose likely ineffective; down to 150mg am/100mg pm   S/p Gastric sleeve 2016 - follows with [facility]; maintaining weight;  triathlete  ZIGGY resolved after gastric sleeve      Vitals:   /62 (Site: Left Upper Arm, Position: Sitting)   Pulse 94   Temp 97.5 °F (36.4 °C)   Ht 1.6 m (5' 3\")   Wt 91.2 kg (201 lb)   SpO2 99%   BMI 35.61 kg/m²   Wt Readings from Last 3 Encounters:   10/01/24 91.2 kg (201 lb)   05/21/24 91.8 kg (202 lb 6.4 oz)   03/27/24 92.1 kg (203 lb)       PE:  Constitutional - alert, well appearing, and in no distress  Ears: wnl b/l  Eyes - extraocular eye movements intact, left eye normal, right eye normal, no conjunctivitis noted  Neck - symmetric, no obvious masses noted  Respiratory- clear to auscultation, no wheezes, rales or rhonchi, symmetric air entry; no increased work of breathing  Cardiovascular - normal rate, regular rhythm, normal S1, S2, no murmurs, rubs, clicks or gallops  Extremities - no edema noted  Abdomen - soft, nontender, nondistended  Skin - normal coloration and turgor, no rashes, no suspicious skin lesions noted      A / P:     Diagnosis Orders   1. Hyperlipidemia,

## 2024-10-01 NOTE — TELEPHONE ENCOUNTER
Pt was in office and [facility] and were supposed to go to [facility]. Please advise

## 2024-10-21 ENCOUNTER — OFFICE VISIT (OUTPATIENT)
Dept: PSYCHOLOGY | Age: 39
End: 2024-10-21
Payer: COMMERCIAL

## 2024-10-21 DIAGNOSIS — F33.41 MAJOR DEPRESSIVE DISORDER, RECURRENT EPISODE, IN PARTIAL REMISSION WITH ANXIOUS DISTRESS (HCC): Primary | ICD-10-CM

## 2024-10-21 PROCEDURE — 1036F TOBACCO NON-USER: CPT | Performed by: PSYCHOLOGIST

## 2024-10-21 PROCEDURE — 90834 PSYTX W PT 45 MINUTES: CPT | Performed by: PSYCHOLOGIST

## 2024-10-21 NOTE — PROGRESS NOTES
learn how to not get over-involved and over-commited.  (Setting boundaries. To learn to say no politely.)  3) Publish at least one academic paper in the next 2 years. Improve eating plan by prioritizing protein. Consistently utilize meal planing. Sticking with macronutrient goals.    Time management plan:  M:  1-5pm Tier 1 tasks on campus    T:  11-2pm teaching;          2pm-3:30 pm office hours - Tier 2 tasks     W: 12pm-1pm Tier 1 tasks on campus       1-4pm office hours - Tier 2 tasks     Th:  11-2pm teaching                      2pm-3:30 pm office hours     F:  Tier 3 tasks remotely    Tier 1:  - Academic writing  - Course preparation (preparing lectures, activities)    Tier 2:  - Grading  - Admin tasks (Grievance committee, Sociology )      Tier 3:    - Emails   - Making schedules  - Developing checklists  - Making plan for the following week  - Union tasks  - [employer] voters  - Political tasks       Timeline:       Mild Progress Moderate Progress Goal Met      Description: To have created a schedule and/or organizational plan that can be consistently implemented. Also incorporate meal plan (e.g., protein, carbs, and fat targets).     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      To develop personal philosophy on how to decline involvement in service roles when invited and/or how to quit roles that are no longer bringing valuable results (over commitment).     Track eating - keep a food diary.     10% reduction on PHQ-9 and PRIYANKA-7 Maintaining organizational plan.     To avoid taking on any new service responsibilities.      To avoid volunteering for Representative [employer] elections role.         20% reduction on PHQ-9 and PRIYANKA-7 Able to adapt organizational plan when necessary.      Only performing service roles that are valuable uses of time.      Article ready for publication submission (with the goal ultimately of publishing

## 2024-11-18 ENCOUNTER — OFFICE VISIT (OUTPATIENT)
Dept: PSYCHOLOGY | Age: 39
End: 2024-11-18
Payer: COMMERCIAL

## 2024-11-18 DIAGNOSIS — F33.41 MAJOR DEPRESSIVE DISORDER, RECURRENT EPISODE, IN PARTIAL REMISSION WITH ANXIOUS DISTRESS (HCC): Primary | ICD-10-CM

## 2024-11-18 PROCEDURE — 90834 PSYTX W PT 45 MINUTES: CPT | Performed by: PSYCHOLOGIST

## 2024-11-18 PROCEDURE — 1036F TOBACCO NON-USER: CPT | Performed by: PSYCHOLOGIST

## 2024-11-18 NOTE — PROGRESS NOTES
Behavioral Health Follow-Up   [facility]    Time Start: 10:00 am  Time End:  10:50 am  Date of Service:  11/18/2024  Session #: 25    Subjective: Has had family conflicts arise between her mother and sister Marycruz.  Distress related to palliative status of her dog due to an oral tumor.  Stress due to upcoming obligation with her employment.    Symptoms reported: Frustration/stress due to occupational and family issues    Objective:    Mental Status:    Appearance: Appears stated age and Well-kept   Sensorium: alert   Affect:  consistent with expectations based upon mood   Mood:   Stressed (improving)   Thought Process:  Goal-Directed   Thought Content: no evidence of impairment   Behavior:   Cooperative   Psychomotor: Relaxed   Speech:   appropriate quality, quantity and organization of sentences   Orientation:  oriented to person, place, time, and general circumstances   Judgment & Insight:  normal insight and judgment   Memory: Intact         Assessment:   Progress/response to treatment:   Since initiation of treatment: Improved occupational-related organization and time management. 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  Completed home improvement/organization projects. Improvements with eating goals (e.g., meal planning; eating lunch). Submitted extended abstract and book chapter for publication.  Has all the necessary publication opportunities set up to meet requirements for her next bi-annual review in Spring 2025. Her  has been employed, although at a temporary job through November.  Therefore improvements in their household finances.  They are also working together on eating/dietary change goals. 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 Has improved organizational plan with calendar.     Since last visit:  Continues to successfully use time management strategies for occupational

## 2024-12-02 ENCOUNTER — E-VISIT (OUTPATIENT)
Dept: FAMILY MEDICINE CLINIC | Age: 39
End: 2024-12-02
Payer: COMMERCIAL

## 2024-12-02 DIAGNOSIS — N30.00 ACUTE CYSTITIS WITHOUT HEMATURIA: Primary | ICD-10-CM

## 2024-12-02 PROCEDURE — 99422 OL DIG E/M SVC 11-20 MIN: CPT | Performed by: FAMILY MEDICINE

## 2024-12-03 RX ORDER — SULFAMETHOXAZOLE AND TRIMETHOPRIM 800; 160 MG/1; MG/1
1 TABLET ORAL 2 TIMES DAILY
Qty: 10 TABLET | Refills: 0 | Status: SHIPPED | OUTPATIENT
Start: 2024-12-03 | End: 2024-12-08

## 2024-12-03 NOTE — PROGRESS NOTES
Telehealth Evisit: UTI    S:  As documented.   O:  NA  A/P:   Diagnosis Orders   1. Acute cystitis without hematuria  sulfamethoxazole-trimethoprim (BACTRIM DS) 800-160 MG per tablet    Urinalysis with Microscopic    Culture, Urine         Empirically treat  Stop by office to drop off urine    11-20 minutes were spent on the digital evaluation and management of this patient.      An electronic signature was used to authenticate this note.  ---- Whitley Jeronimo MD on 12/3/2024 at 7:54 AM

## 2024-12-03 NOTE — PATIENT INSTRUCTIONS
Yokasta,  I sent over an antibiotic for you.  However, I'd like you to come to the office to drop off a urine specimen before you start the antibiotic.  You won't have to see me. However, it is important for us to run a culture on your urine to verify the antibiotic is the correct one.   You can start the antibiotic right after you provide the urine.   Dr. Jeronimo

## 2024-12-16 DIAGNOSIS — H69.93 DYSFUNCTION OF BOTH EUSTACHIAN TUBES: ICD-10-CM

## 2024-12-16 RX ORDER — AZELASTINE HYDROCHLORIDE 137 UG/1
SPRAY, METERED NASAL
Qty: 30 ML | Refills: 3 | OUTPATIENT
Start: 2024-12-16

## 2024-12-19 ENCOUNTER — TELEMEDICINE ON DEMAND (OUTPATIENT)
Age: 39
End: 2024-12-19
Payer: COMMERCIAL

## 2024-12-19 DIAGNOSIS — J01.90 ACUTE BACTERIAL RHINOSINUSITIS: Primary | ICD-10-CM

## 2024-12-19 DIAGNOSIS — B96.89 ACUTE BACTERIAL RHINOSINUSITIS: Primary | ICD-10-CM

## 2024-12-19 PROCEDURE — 1036F TOBACCO NON-USER: CPT | Performed by: NURSE PRACTITIONER

## 2024-12-19 PROCEDURE — G8427 DOCREV CUR MEDS BY ELIG CLIN: HCPCS | Performed by: NURSE PRACTITIONER

## 2024-12-19 PROCEDURE — G8484 FLU IMMUNIZE NO ADMIN: HCPCS | Performed by: NURSE PRACTITIONER

## 2024-12-19 PROCEDURE — 99213 OFFICE O/P EST LOW 20 MIN: CPT | Performed by: NURSE PRACTITIONER

## 2024-12-19 PROCEDURE — G8417 CALC BMI ABV UP PARAM F/U: HCPCS | Performed by: NURSE PRACTITIONER

## 2024-12-19 RX ORDER — AZITHROMYCIN 250 MG/1
250 TABLET, FILM COATED ORAL SEE ADMIN INSTRUCTIONS
Qty: 6 TABLET | Refills: 0 | Status: SHIPPED | OUTPATIENT
Start: 2024-12-19 | End: 2024-12-24

## 2024-12-19 ASSESSMENT — ENCOUNTER SYMPTOMS
SINUS PAIN: 1
SORE THROAT: 1
RHINORRHEA: 1

## 2024-12-19 NOTE — PROGRESS NOTES
Livier Feliz (:  1985) is a Established patient, here for evaluation of the following:    Sore Throat (X1 day)       Assessment & Plan:  Below is the assessment and plan developed based on review of pertinent history, physical exam, labs, studies, and medications.  1. Acute bacterial rhinosinusitis  To take antibiotic only if symptoms worsen in 5-6 days  Antihistamine of choice- Allegra, Zyrtec, Claritin  Mucinex may provide symptom relief  Sinus Flushing 2x day as able followed by nasal steroid inhalation as prescribed  Push fluids  Tylenol/Motrin for discomfort and fever  Sudafed 120mg twice daily if not hypertensive    -     azithromycin (ZITHROMAX) 250 MG tablet; Take 1 tablet by mouth See Admin Instructions for 5 days 500mg on day 1 followed by 250mg on days 2 - 5, Disp-6 tablet, R-0Normal    No follow-ups on file.  The patient would benefit from future follow up with their usual PCP. As of the end of their Virtualist Visit today, follow up visit status is as follows: Patient to follow up as previously instructed by PCP    Subjective:   Sinus Pain  Patient complains of congestion, post nasal drip, sinus pressure, sore throat, and fatigue. Onset of symptoms was 2 days ago. Her  was diagnosed with bronchitis 5 days ago. She is leaving town on Saturday. Symptoms have been gradually worsening since that time. She is drinking plenty of fluids.  Past history is significant for asthma. Patient is non-smoker. Discussed with patient watchful waiting and taking the medication with her out of town in anticipating of worsening symptoms.         Review of Systems   Constitutional:  Positive for fatigue.   HENT:  Positive for postnasal drip, rhinorrhea, sinus pain and sore throat.        Objective:  Patient-Reported Vitals  Patient-Reported Pulse: 78  Patient-Reported Weight: 201  Patient-Reported Height: 5’4”           2024     1:33 PM   Patient-Reported Vitals   Patient-Reported Weight 201

## 2025-01-27 ENCOUNTER — OFFICE VISIT (OUTPATIENT)
Dept: PSYCHOLOGY | Age: 40
End: 2025-01-27
Payer: COMMERCIAL

## 2025-01-27 DIAGNOSIS — F33.1 MAJOR DEPRESSIVE DISORDER, RECURRENT EPISODE, MODERATE (HCC): Primary | ICD-10-CM

## 2025-01-27 PROCEDURE — 90834 PSYTX W PT 45 MINUTES: CPT | Performed by: PSYCHOLOGIST

## 2025-01-27 PROCEDURE — 1036F TOBACCO NON-USER: CPT | Performed by: PSYCHOLOGIST

## 2025-01-27 NOTE — PROGRESS NOTES
Behavioral Health Follow-Up   [facility]    Time Start: 10:10 am  Time End:  11:00 am  Date of Service:  1/27/2025  Session #: 26      Subjective:  undergoing work-up for neck/throat mass and lesions for malignancy.    Symptoms reported: Significant stress, depressed mood    Objective:    Mental Status:    Appearance: Appears stated age and Well-kept   Sensorium: alert   Affect:  consistent with expectations based upon mood   Mood:   Stressed/anxious   Thought Process:  Goal-Directed   Thought Content: no evidence of impairment   Behavior:   Cooperative   Psychomotor: Tense   Speech:   appropriate quality, quantity and organization of sentences   Orientation:  oriented to person, place, time, and general circumstances   Judgment & Insight:  normal insight and judgment   Memory: Intact         Assessment:   Progress/response to treatment:   Since initiation of treatment: Improved occupational-related organization and time management. 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  Completed home improvement/organization projects. Improvements with eating goals (e.g., meal planning; eating lunch). Submitted extended abstract and book chapter for publication.  Has all the necessary publication opportunities set up to meet requirements for her next bi-annual review in Spring 2025. Her  has been employed, although at a temporary job through November.  Therefore improvements in their household finances.  They are also working together on eating/dietary change goals. 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 Has improved organizational plan with calendar.     Since last visit:  Increased stress due to 's medical issues.    Risk: Focused assessment deferred, prior evaluation yielded minimal suicidal/homicidal risk and there are no new circumstances to warrant reassessment.      Protective Factors:

## 2025-01-27 NOTE — PATIENT INSTRUCTIONS
COPING WITH DAVID'S CANCER WORK UP.    Family issues  Talk with Bernadette about her role in his care.  Give her specific jobs/role. \"The way you can be most helpful is XYZ.\" Be empathetic, but set boundaries.    Family can give rides, however consider just you and David at appointments  Sign up for [facility]  If people ask to help, give them a job  Encourage family to still do normal family things/activities  It is not your job to manage family's emotions    2.  Role in David's care   A. Attend all appointments if possible   B. Be aware of all details of his plan/care   C. Get a \"doctor's appt\" notebook, write down all instructions and questions   D. Get access to his [employer]   E. Talk to chair soon    3.   Finances   A. Look up exact deductible and out of pocket max   B. Apply as soon as possible for cancer support programs   C. See what you can cut for the future, just in case   D. Take a look at your benefits - may be anunexpected expenses benefit    4.  Optimism   A.  Focus on the facts (no rabbit holes)   B.  Do not google, do not allow anyone else's experience to influence your decision making   C. Be optimistic even in this tough situation   D. Avoid reading or making conclusions on MyChart test results before getting a doctor's explanation

## 2025-01-28 DIAGNOSIS — N30.00 ACUTE CYSTITIS WITHOUT HEMATURIA: ICD-10-CM

## 2025-01-28 DIAGNOSIS — Z98.84 S/P LAPAROSCOPIC SLEEVE GASTRECTOMY: ICD-10-CM

## 2025-01-28 PROBLEM — F33.41 MAJOR DEPRESSIVE DISORDER, RECURRENT EPISODE, IN PARTIAL REMISSION WITH ANXIOUS DISTRESS (HCC): Status: RESOLVED | Noted: 2023-05-02 | Resolved: 2025-01-28

## 2025-01-28 PROBLEM — F33.1 MAJOR DEPRESSIVE DISORDER, RECURRENT EPISODE, MODERATE (HCC): Status: ACTIVE | Noted: 2025-01-28

## 2025-01-28 LAB
ALBUMIN: 3.9 G/DL (ref 3.5–5.2)
ALP BLD-CCNC: 116 U/L (ref 35–104)
ALT SERPL-CCNC: 29 U/L (ref 0–32)
ANION GAP SERPL CALCULATED.3IONS-SCNC: 13 MMOL/L (ref 7–16)
AST SERPL-CCNC: 27 U/L (ref 0–31)
BACTERIA: ABNORMAL
BASOPHILS ABSOLUTE: 0.04 K/UL (ref 0–0.2)
BASOPHILS RELATIVE PERCENT: 1 % (ref 0–2)
BILIRUB SERPL-MCNC: 0.5 MG/DL (ref 0–1.2)
BILIRUBIN, URINE: NEGATIVE
BUN BLDV-MCNC: 11 MG/DL (ref 6–20)
CALCIUM SERPL-MCNC: 8.9 MG/DL (ref 8.6–10.2)
CHLORIDE BLD-SCNC: 100 MMOL/L (ref 98–107)
CO2: 23 MMOL/L (ref 22–29)
COLOR, UA: YELLOW
CREAT SERPL-MCNC: 0.8 MG/DL (ref 0.5–1)
EOSINOPHILS ABSOLUTE: 0.04 K/UL (ref 0.05–0.5)
EOSINOPHILS RELATIVE PERCENT: 1 % (ref 0–6)
EPITHELIAL CELLS, UA: ABNORMAL /HPF
FERRITIN: 248 NG/ML
GFR, ESTIMATED: >90 ML/MIN/1.73M2
GLUCOSE BLD-MCNC: 79 MG/DL (ref 74–99)
GLUCOSE URINE: NEGATIVE MG/DL
HCT VFR BLD CALC: 43.9 % (ref 34–48)
HEMOGLOBIN: 14.4 G/DL (ref 11.5–15.5)
IMMATURE GRANULOCYTES %: 0 % (ref 0–5)
IMMATURE GRANULOCYTES ABSOLUTE: <0.03 K/UL (ref 0–0.58)
IRON % SATURATION: 46 % (ref 15–50)
IRON: 129 UG/DL (ref 37–145)
KETONES, URINE: NEGATIVE MG/DL
LEUKOCYTE ESTERASE, URINE: ABNORMAL
LYMPHOCYTES ABSOLUTE: 1.36 K/UL (ref 1.5–4)
LYMPHOCYTES RELATIVE PERCENT: 24 % (ref 20–42)
MAGNESIUM: 2.2 MG/DL (ref 1.6–2.6)
MCH RBC QN AUTO: 31 PG (ref 26–35)
MCHC RBC AUTO-ENTMCNC: 32.8 G/DL (ref 32–34.5)
MCV RBC AUTO: 94.6 FL (ref 80–99.9)
MONOCYTES ABSOLUTE: 0.45 K/UL (ref 0.1–0.95)
MONOCYTES RELATIVE PERCENT: 8 % (ref 2–12)
NEUTROPHILS ABSOLUTE: 3.85 K/UL (ref 1.8–7.3)
NEUTROPHILS RELATIVE PERCENT: 67 % (ref 43–80)
NITRITE, URINE: NEGATIVE
PDW BLD-RTO: 11.9 % (ref 11.5–15)
PH, URINE: 8 (ref 5–9)
PHOSPHORUS: 2.9 MG/DL (ref 2.5–4.5)
PLATELET # BLD: 303 K/UL (ref 130–450)
PMV BLD AUTO: 10.6 FL (ref 7–12)
POTASSIUM SERPL-SCNC: 4 MMOL/L (ref 3.5–5)
PROTEIN UA: 30 MG/DL
PTH INTACT: 63.6 PG/ML (ref 15–65)
RBC # BLD: 4.64 M/UL (ref 3.5–5.5)
RBC UA: ABNORMAL /HPF
SODIUM BLD-SCNC: 136 MMOL/L (ref 132–146)
SPECIFIC GRAVITY UA: 1.02 (ref 1–1.03)
TOTAL IRON BINDING CAPACITY: 282 UG/DL (ref 250–450)
TOTAL PROTEIN: 6.6 G/DL (ref 6.4–8.3)
TSH SERPL DL<=0.05 MIU/L-ACNC: 2.2 UIU/ML (ref 0.27–4.2)
TURBIDITY: ABNORMAL
URIC ACID: 4.4 MG/DL (ref 2.4–5.7)
URINE HGB: ABNORMAL
UROBILINOGEN, URINE: 0.2 EU/DL (ref 0–1)
VITAMIN D 25-HYDROXY: 56 NG/ML (ref 30–100)
WBC # BLD: 5.8 K/UL (ref 4.5–11.5)
WBC UA: ABNORMAL /HPF

## 2025-01-29 LAB
FOLATE: >20 NG/ML (ref 4.8–24.2)
VITAMIN B-12: 1100 PG/ML (ref 211–946)

## 2025-01-30 LAB
CULTURE: NORMAL
CULTURE: NORMAL
SPECIMEN DESCRIPTION: NORMAL

## 2025-01-31 LAB — ZINC: 82.9 UG/DL (ref 60–120)

## 2025-02-01 SDOH — ECONOMIC STABILITY: FOOD INSECURITY: WITHIN THE PAST 12 MONTHS, THE FOOD YOU BOUGHT JUST DIDN'T LAST AND YOU DIDN'T HAVE MONEY TO GET MORE.: NEVER TRUE

## 2025-02-01 SDOH — ECONOMIC STABILITY: FOOD INSECURITY: WITHIN THE PAST 12 MONTHS, YOU WORRIED THAT YOUR FOOD WOULD RUN OUT BEFORE YOU GOT MONEY TO BUY MORE.: NEVER TRUE

## 2025-02-01 SDOH — ECONOMIC STABILITY: INCOME INSECURITY: IN THE LAST 12 MONTHS, WAS THERE A TIME WHEN YOU WERE NOT ABLE TO PAY THE MORTGAGE OR RENT ON TIME?: NO

## 2025-02-01 ASSESSMENT — PATIENT HEALTH QUESTIONNAIRE - PHQ9
8. MOVING OR SPEAKING SO SLOWLY THAT OTHER PEOPLE COULD HAVE NOTICED. OR THE OPPOSITE - BEING SO FIDGETY OR RESTLESS THAT YOU HAVE BEEN MOVING AROUND A LOT MORE THAN USUAL: NOT AT ALL
7. TROUBLE CONCENTRATING ON THINGS, SUCH AS READING THE NEWSPAPER OR WATCHING TELEVISION: SEVERAL DAYS
4. FEELING TIRED OR HAVING LITTLE ENERGY: SEVERAL DAYS
4. FEELING TIRED OR HAVING LITTLE ENERGY: SEVERAL DAYS
SUM OF ALL RESPONSES TO PHQ QUESTIONS 1-9: 7
8. MOVING OR SPEAKING SO SLOWLY THAT OTHER PEOPLE COULD HAVE NOTICED. OR THE OPPOSITE, BEING SO FIGETY OR RESTLESS THAT YOU HAVE BEEN MOVING AROUND A LOT MORE THAN USUAL: NOT AT ALL
3. TROUBLE FALLING OR STAYING ASLEEP: SEVERAL DAYS
2. FEELING DOWN, DEPRESSED OR HOPELESS: SEVERAL DAYS
3. TROUBLE FALLING OR STAYING ASLEEP: SEVERAL DAYS
6. FEELING BAD ABOUT YOURSELF - OR THAT YOU ARE A FAILURE OR HAVE LET YOURSELF OR YOUR FAMILY DOWN: SEVERAL DAYS
SUM OF ALL RESPONSES TO PHQ QUESTIONS 1-9: 7
9. THOUGHTS THAT YOU WOULD BE BETTER OFF DEAD, OR OF HURTING YOURSELF: NOT AT ALL
SUM OF ALL RESPONSES TO PHQ QUESTIONS 1-9: 7
5. POOR APPETITE OR OVEREATING: SEVERAL DAYS
9. THOUGHTS THAT YOU WOULD BE BETTER OFF DEAD, OR OF HURTING YOURSELF: NOT AT ALL
10. IF YOU CHECKED OFF ANY PROBLEMS, HOW DIFFICULT HAVE THESE PROBLEMS MADE IT FOR YOU TO DO YOUR WORK, TAKE CARE OF THINGS AT HOME, OR GET ALONG WITH OTHER PEOPLE: SOMEWHAT DIFFICULT
SUM OF ALL RESPONSES TO PHQ QUESTIONS 1-9: 7
7. TROUBLE CONCENTRATING ON THINGS, SUCH AS READING THE NEWSPAPER OR WATCHING TELEVISION: SEVERAL DAYS
1. LITTLE INTEREST OR PLEASURE IN DOING THINGS: SEVERAL DAYS
1. LITTLE INTEREST OR PLEASURE IN DOING THINGS: SEVERAL DAYS
SUM OF ALL RESPONSES TO PHQ QUESTIONS 1-9: 7
10. IF YOU CHECKED OFF ANY PROBLEMS, HOW DIFFICULT HAVE THESE PROBLEMS MADE IT FOR YOU TO DO YOUR WORK, TAKE CARE OF THINGS AT HOME, OR GET ALONG WITH OTHER PEOPLE: SOMEWHAT DIFFICULT
6. FEELING BAD ABOUT YOURSELF - OR THAT YOU ARE A FAILURE OR HAVE LET YOURSELF OR YOUR FAMILY DOWN: SEVERAL DAYS
5. POOR APPETITE OR OVEREATING: SEVERAL DAYS
SUM OF ALL RESPONSES TO PHQ9 QUESTIONS 1 & 2: 2
2. FEELING DOWN, DEPRESSED OR HOPELESS: SEVERAL DAYS

## 2025-02-04 ENCOUNTER — OFFICE VISIT (OUTPATIENT)
Dept: FAMILY MEDICINE CLINIC | Age: 40
End: 2025-02-04
Payer: COMMERCIAL

## 2025-02-04 VITALS
BODY MASS INDEX: 34.25 KG/M2 | SYSTOLIC BLOOD PRESSURE: 128 MMHG | WEIGHT: 200.6 LBS | HEART RATE: 93 BPM | OXYGEN SATURATION: 100 % | DIASTOLIC BLOOD PRESSURE: 84 MMHG | RESPIRATION RATE: 16 BRPM | TEMPERATURE: 98.2 F | HEIGHT: 64 IN

## 2025-02-04 DIAGNOSIS — J45.990 EXERCISE-INDUCED ASTHMA: ICD-10-CM

## 2025-02-04 DIAGNOSIS — E78.5 HYPERLIPIDEMIA, UNSPECIFIED HYPERLIPIDEMIA TYPE: ICD-10-CM

## 2025-02-04 DIAGNOSIS — E03.9 ACQUIRED HYPOTHYROIDISM: ICD-10-CM

## 2025-02-04 DIAGNOSIS — R31.9 HEMATURIA, UNSPECIFIED TYPE: Primary | ICD-10-CM

## 2025-02-04 DIAGNOSIS — F33.1 MAJOR DEPRESSIVE DISORDER, RECURRENT EPISODE, MODERATE (HCC): ICD-10-CM

## 2025-02-04 DIAGNOSIS — R74.8 ELEVATED ALKALINE PHOSPHATASE LEVEL: ICD-10-CM

## 2025-02-04 LAB
BILIRUBIN, POC: NORMAL
BLOOD URINE, POC: NORMAL
CLARITY, POC: NORMAL
COLOR, POC: NORMAL
GLUCOSE URINE, POC: NORMAL MG/DL
KETONES, POC: NORMAL MG/DL
LEUKOCYTE EST, POC: NORMAL
NITRITE, POC: NORMAL
PH, POC: 5.5
PROTEIN, POC: 30 MG/DL
SPECIFIC GRAVITY, POC: >1.03
UROBILINOGEN, POC: 0.2 MG/DL

## 2025-02-04 PROCEDURE — 99214 OFFICE O/P EST MOD 30 MIN: CPT | Performed by: FAMILY MEDICINE

## 2025-02-04 PROCEDURE — 1036F TOBACCO NON-USER: CPT | Performed by: FAMILY MEDICINE

## 2025-02-04 PROCEDURE — G8417 CALC BMI ABV UP PARAM F/U: HCPCS | Performed by: FAMILY MEDICINE

## 2025-02-04 PROCEDURE — G8427 DOCREV CUR MEDS BY ELIG CLIN: HCPCS | Performed by: FAMILY MEDICINE

## 2025-02-04 PROCEDURE — 81002 URINALYSIS NONAUTO W/O SCOPE: CPT | Performed by: FAMILY MEDICINE

## 2025-02-04 NOTE — PROGRESS NOTES
CC: Livier Feliz is a 39 y.o. 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 in [place] but this might be on hold now; may participate in something in August in the states    HLD - not on medical therapy  Hypothyroidism - on synthroid 100mcg  Asthma, ex induced: controlled on pre-exercise albuterol; last attack 5/2024 only one in about >1 year and not needing additional albuterol  Seasonal allergies - on xyzal; alternates flonase, astelin every other day -- per previous  Anxiety and depression - hx of hospitalization 2005 for SI;currently on wellubtrin and effexor (no xl due to hx of gastric sleeve); [facility]; previous attempt to decrease dose of effexor unsuccessful -- currently weaning due to high dose likely ineffective; down to 150mg am/100mg pm -- ** see above; She is struggling with depression worse related to 's medical dx; she is seeing her therapist every other week and did see psych yesterday; they are planning to stop the wellbutrin and start Auvelity which will be ready for pick-up on Wednesday  S/p Gastric sleeve 2016 - follows with [facility]; maintaining weight;  triathlete  ZIGGY resolved after gastric sleeve  Was seeing gyn; UTD with cervical screening; plans to establish with Dr. Hannah      Vitals:   /84 (Site: Left Upper Arm, Position: Sitting)   Pulse 93   Temp 98.2 °F (36.8 °C)   Resp 16   Ht 1.626 m (5' 4\")   Wt 91 kg (200 lb 9.6 oz)   LMP 01/15/2025   SpO2 100%   BMI 34.43 kg/m²   Wt Readings from Last 3 Encounters:   02/04/25 91 kg (200 lb

## 2025-02-05 LAB — VITAMIN B1 WHOLE BLOOD: 127 NMOL/L (ref 70–180)

## 2025-02-06 LAB
CULTURE: NORMAL
SPECIMEN DESCRIPTION: NORMAL
VITAMIN B6: 45.8 NMOL/L (ref 20–125)
VITAMIN K: 0.26 NMOL/L (ref 0.22–4.88)

## 2025-02-10 DIAGNOSIS — R31.9 URINARY TRACT INFECTION WITH HEMATURIA, SITE UNSPECIFIED: Primary | ICD-10-CM

## 2025-02-10 DIAGNOSIS — N39.0 URINARY TRACT INFECTION WITH HEMATURIA, SITE UNSPECIFIED: Primary | ICD-10-CM

## 2025-02-10 RX ORDER — CEFDINIR 300 MG/1
300 CAPSULE ORAL 2 TIMES DAILY
Qty: 14 CAPSULE | Refills: 0 | Status: SHIPPED | OUTPATIENT
Start: 2025-02-10 | End: 2025-02-17

## 2025-02-14 ENCOUNTER — OFFICE VISIT (OUTPATIENT)
Dept: PSYCHOLOGY | Age: 40
End: 2025-02-14
Payer: COMMERCIAL

## 2025-02-14 DIAGNOSIS — F33.1 MAJOR DEPRESSIVE DISORDER, RECURRENT EPISODE, MODERATE (HCC): Primary | ICD-10-CM

## 2025-02-14 PROCEDURE — 90834 PSYTX W PT 45 MINUTES: CPT | Performed by: PSYCHOLOGIST

## 2025-02-14 PROCEDURE — 1036F TOBACCO NON-USER: CPT | Performed by: PSYCHOLOGIST

## 2025-02-14 NOTE — PATIENT INSTRUCTIONS
Debrief with David before telling his mom anything.  Keep everything fact based with communication to family. You do not need to tell him everything (shruti information that could be misinterpreted or is not certainty).   Meet with [facility] social work or financial aid staff.  Tell David's mom that you do not want a public .  Communicate to family that sometimes the best thing to do is just treat David normally, not as a cancer patient.

## 2025-02-14 NOTE — PROGRESS NOTES
Behavioral Health Follow-Up   [facility]    Time Start: 1:00 pm  Time End: 1:50 pm  Date of Service:  2/14/2025  Session #: 27      Subjective:  undergoing work-up for neck/throat mass and lesions for malignancy.    Symptoms reported: Significant stress, depressed mood    Objective:    Mental Status:    Appearance: Appears stated age and Well-kept   Sensorium: alert   Affect:  consistent with expectations based upon mood   Mood:   Stressed/anxious   Thought Process:  Goal-Directed   Thought Content: no evidence of impairment   Behavior:   Cooperative   Psychomotor: Tense   Speech:   appropriate quality, quantity and organization of sentences   Orientation:  oriented to person, place, time, and general circumstances   Judgment & Insight:  normal insight and judgment   Memory: Intact         Assessment:   Progress/response to treatment:   Since initiation of treatment: Improved occupational-related organization and time management. 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  Completed home improvement/organization projects. Improvements with eating goals (e.g., meal planning; eating lunch).  Has all the necessary publication opportunities set up to meet requirements for her next bi-annual review in Spring 2025. Has improved organizational plan with calendar.     Since last visit:  Increased stress due to 's medical issues.    Risk: Focused assessment deferred, prior evaluation yielded minimal suicidal/homicidal risk and there are no new circumstances to warrant reassessment.      Protective Factors: denial of impulses, hopeful for improved circumstances, future goals, and making progress in treatment      ICD-10-CM    1. Major depressive disorder, recurrent episode, moderate (HCC)  F33.1               Psychotherapy Intervention:  Modalities:  Problem-solving    Discussed strategies to cope with her 's medical work up.  He is being  Detail Level: Detailed Depth Of Biopsy: dermis Was A Bandage Applied: Yes Size Of Lesion In Cm: 0 Biopsy Type: H and E Biopsy Method: Dermablade Anesthesia Type: 1% lidocaine with epinephrine Anesthesia Volume In Cc (Will Not Render If 0): 0.7 Hemostasis: TCA 35% Wound Care: Polysporin ointment Dressing: Band-Aid Destruction After The Procedure: No Type Of Destruction Used: Curettage Curettage Text: The wound bed was treated with curettage after the biopsy was performed. Cryotherapy Text: The wound bed was treated with cryotherapy after the biopsy was performed. Electrodesiccation Text: The wound bed was treated with electrodesiccation after the biopsy was performed. Electrodesiccation And Curettage Text: The wound bed was treated with electrodesiccation and curettage after the biopsy was performed. Silver Nitrate Text: The wound bed was treated with silver nitrate after the biopsy was performed. Consent: Verbal consent and/or verbal consent was obtained and risks were reviewed including but not limited to scarring, infection, bleeding, scabbing, incomplete removal, nerve damage and allergy to anesthesia. Post-Care Instructions: I reviewed with the patient in detail post-care instructions. Patient is to keep the biopsy site dry overnight, and then apply Polysporin/ Aquaphor twice daily until healed. Avoid Neosporin due to allergy risk. Notification Instructions: Patient will be notified of biopsy results. However, patient instructed to call the office if not contacted within 2 weeks. Billing Type: Third-Party Bill Information: Selecting Yes will display possible errors in your note based on the variables you have selected. This validation is only offered as a suggestion for you. PLEASE NOTE THAT THE VALIDATION TEXT WILL BE REMOVED WHEN YOU FINALIZE YOUR NOTE. IF YOU WANT TO FAX A PRELIMINARY NOTE YOU WILL NEED TO TOGGLE THIS TO 'NO' IF YOU DO NOT WANT IT IN YOUR FAXED NOTE.

## 2025-02-27 ENCOUNTER — OFFICE VISIT (OUTPATIENT)
Dept: PSYCHOLOGY | Age: 40
End: 2025-02-27
Payer: COMMERCIAL

## 2025-02-27 DIAGNOSIS — F33.1 MAJOR DEPRESSIVE DISORDER, RECURRENT EPISODE, MODERATE (HCC): Primary | ICD-10-CM

## 2025-02-27 PROCEDURE — 1036F TOBACCO NON-USER: CPT | Performed by: PSYCHOLOGIST

## 2025-02-27 PROCEDURE — 90834 PSYTX W PT 45 MINUTES: CPT | Performed by: PSYCHOLOGIST

## 2025-02-27 NOTE — PROGRESS NOTES
reduction on PHQ-9 and PRIYANKA-7   Target time frame: 1-2 months or ~ 3-5 visits    2-3 months or ~ 4-6 visits 3-6 months or ~ 6-8 visits   Notes on progress:  As of 7/31/23 -  Has developed color-coded organizational system, will need to wait until Fall semester begins to fully implement.  Completed home office organization.  Has declined some responsibilities with [employer] voters, and also did some advance planning for their activities for the fall 2023 election. Chosen book for new course prep.   Goals met as of 9/11/23 Progress as of 2/26/24. PHQ-9 = 0. Making progress with organization and stress eating, however not yet at goal.     Progress as of 8/20/24:  presented at national conference.    Submitted extended abstract and book chapter for publication.      Visits will occur every 2-4 weeks.     Length of treatment plan: 3- 6 months     Types of treatment to be used: Cognitive Behavioral     Other professional, community, and/or natural supports involved in treatment: [facility] (psychotropic medication management)     Livier Feliz is in agreement with this treatment plan and was provided a copy.

## 2025-03-10 DIAGNOSIS — R31.9 URINARY TRACT INFECTION WITH HEMATURIA, SITE UNSPECIFIED: ICD-10-CM

## 2025-03-10 DIAGNOSIS — N39.0 URINARY TRACT INFECTION WITH HEMATURIA, SITE UNSPECIFIED: ICD-10-CM

## 2025-03-10 LAB
BILIRUBIN, URINE: NEGATIVE
COLOR, UA: YELLOW
COMMENT: NORMAL
GLUCOSE URINE: NEGATIVE MG/DL
KETONES, URINE: NEGATIVE MG/DL
LEUKOCYTE ESTERASE, URINE: NEGATIVE
NITRITE, URINE: NEGATIVE
PH, URINE: 6.5 (ref 5–8)
PROTEIN UA: NEGATIVE MG/DL
SPECIFIC GRAVITY UA: 1.02 (ref 1–1.03)
TURBIDITY: CLEAR
URINE HGB: NEGATIVE
UROBILINOGEN, URINE: 0.2 EU/DL (ref 0–1)

## 2025-03-12 ENCOUNTER — RESULTS FOLLOW-UP (OUTPATIENT)
Dept: FAMILY MEDICINE CLINIC | Age: 40
End: 2025-03-12

## 2025-03-17 ENCOUNTER — OFFICE VISIT (OUTPATIENT)
Dept: PSYCHOLOGY | Age: 40
End: 2025-03-17
Payer: COMMERCIAL

## 2025-03-17 DIAGNOSIS — F33.1 MAJOR DEPRESSIVE DISORDER, RECURRENT EPISODE, MODERATE (HCC): Primary | ICD-10-CM

## 2025-03-17 PROCEDURE — 1036F TOBACCO NON-USER: CPT | Performed by: PSYCHOLOGIST

## 2025-03-17 PROCEDURE — 90834 PSYTX W PT 45 MINUTES: CPT | Performed by: PSYCHOLOGIST

## 2025-03-17 NOTE — PROGRESS NOTES
treatment      ICD-10-CM    1. Major depressive disorder, recurrent episode, moderate (HCC)  F33.1                   Psychotherapy Intervention:  Modalities:  Problem-solving    Discussed how to cope with this week with her 's cancer surgery being 3 days away.  She has implemented all recommendations discussed at previous visits, including how to respond to her mother in law's misguided efforts to help financially, to debrief with her  after all medical appointments to have a united front on how to communicate information to family, and to be as informed as possible in his care with keeping an organized notebook of all recommendations from physicians.  He will have surgery this week, however it is unclear if his lesion can be removed surgically.  He will be admitted for 5 days to the hospital if the surgery is successful.  However if the lesions cannot be surgically removed, he will have 7 weeks of radiation 5 days/week.  Discussed how to manage the day of the surgery.  Her mother-in-law will likely accompany her.  Also discussed her plan for how she will manage the hospitalization.  Plans to be at the hospital during the day and travel home at night.  She is having good support from extended family and friends with monetary donations for meals and has a friend available to help with her pets at home.  She feels she is getting good support from her network.  Encouraged self-care including sleep, nutrition, and exercise. Her  has had long-term unemployment, and got a very promising interview this week (board of elections).     Care coordination:   [facility].  Auvelity, decreasing to one dose per day from 2 doses, will see her provider soon.      Treatment goal(s) addressed: #1 below    Plan:  Homework/recommendations: See above.    Follow up: 4 weeks, scheduled for 4/14/25.      Electronically signed by Jess Dorantes

## 2025-04-14 ENCOUNTER — OFFICE VISIT (OUTPATIENT)
Dept: PSYCHOLOGY | Age: 40
End: 2025-04-14
Payer: COMMERCIAL

## 2025-04-14 DIAGNOSIS — F33.1 MAJOR DEPRESSIVE DISORDER, RECURRENT EPISODE, MODERATE (HCC): Primary | ICD-10-CM

## 2025-04-14 PROCEDURE — 90834 PSYTX W PT 45 MINUTES: CPT | Performed by: PSYCHOLOGIST

## 2025-04-14 PROCEDURE — 1036F TOBACCO NON-USER: CPT | Performed by: PSYCHOLOGIST

## 2025-04-14 NOTE — PROGRESS NOTES
over-involved and over-commited.  (Setting boundaries. To learn to say no politely.)  3) Publish at least one academic paper in the next 2 years. Improve eating plan by prioritizing protein. Consistently utilize meal planing. Sticking with macronutrient goals.    Time management plan:  M:  1-5pm Tier 1 tasks on campus    T:  11-2pm teaching;          2pm-3:30 pm office hours - Tier 2 tasks     W: 12pm-1pm Tier 1 tasks on campus       1-4pm office hours - Tier 2 tasks     Th:  11-2pm teaching                      2pm-3:30 pm office hours     F:  Tier 3 tasks remotely    Tier 1:  - Academic writing  - Course preparation (preparing lectures, activities)    Tier 2:  - Grading  - Admin tasks (Grievance committee, Sociology )      Tier 3:    - Emails   - Making schedules  - Developing checklists  - Making plan for the following week  - Union tasks  - [employer] voters  - Political tasks       Timeline:       Mild Progress Moderate Progress Goal Met      Description: To have created a schedule and/or organizational plan that can be consistently implemented. Also incorporate meal plan (e.g., protein, carbs, and fat targets).     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      To develop personal philosophy on how to decline involvement in service roles when invited and/or how to quit roles that are no longer bringing valuable results (over commitment).     Track eating - keep a food diary.     10% reduction on PHQ-9 and PRIYANKA-7 Maintaining organizational plan.     To avoid taking on any new service responsibilities.      To avoid volunteering for [employer] elections role.         20% reduction on PHQ-9 and PRIYANKA-7 Able to adapt organizational plan when necessary.      Only performing service roles that are valuable uses of time.      Article ready for publication submission (with the goal ultimately of publishing every 6 months).

## 2025-04-21 DIAGNOSIS — E03.9 ACQUIRED HYPOTHYROIDISM: ICD-10-CM

## 2025-04-21 RX ORDER — LEVOTHYROXINE SODIUM 112 UG/1
112 TABLET ORAL DAILY
Qty: 90 TABLET | Refills: 3 | OUTPATIENT
Start: 2025-04-21

## 2025-04-30 DIAGNOSIS — E03.9 ACQUIRED HYPOTHYROIDISM: ICD-10-CM

## 2025-04-30 RX ORDER — LEVOTHYROXINE SODIUM 112 UG/1
112 TABLET ORAL DAILY
Qty: 90 TABLET | Refills: 0 | Status: SHIPPED | OUTPATIENT
Start: 2025-04-30

## 2025-04-30 NOTE — TELEPHONE ENCOUNTER
Name of Medication(s) Requested:  Requested Prescriptions     Pending Prescriptions Disp Refills    levothyroxine (SYNTHROID) 112 MCG tablet 90 tablet 0     Sig: Take 1 tablet by mouth Daily       Medication is on current medication list Yes    Dosage and directions were verified? Yes    Quantity verified: 90 day supply     Pharmacy Verified?  Yes    Last Appointment:  2/4/2025    Future appts:  Future Appointments   Date Time Provider Department Center   5/5/2025 10:00 AM Jess Dorantes, PhD [facility]   6/5/2025  2:00 PM Whitley Jeronimo MD [facility]        (If no appt send self scheduling link. .REFILLAPPT)  Scheduling request sent?     [] Yes  [x] No    Does patient need updated?  [] Yes  [x] No

## 2025-05-05 ENCOUNTER — OFFICE VISIT (OUTPATIENT)
Dept: PSYCHOLOGY | Age: 40
End: 2025-05-05
Payer: COMMERCIAL

## 2025-05-05 DIAGNOSIS — F33.1 MAJOR DEPRESSIVE DISORDER, RECURRENT EPISODE, MODERATE (HCC): Primary | ICD-10-CM

## 2025-05-05 PROCEDURE — 90832 PSYTX W PT 30 MINUTES: CPT | Performed by: PSYCHOLOGIST

## 2025-05-05 PROCEDURE — 1036F TOBACCO NON-USER: CPT | Performed by: PSYCHOLOGIST

## 2025-05-05 NOTE — PATIENT INSTRUCTIONS
Remain as optimistic as possible.  Be empathetic, but do encourage David to work with you.  Continue problem-solving.  Really brainstorm best options.  [place] with optimal sleeping arrangement.   Make plan about family contacts that works.  There is nothing new to tell them.  If something changes, you will let them know.  Give them a job.   Talk to David about how you will deal with any unexpected events. What to tell family vs. not.

## 2025-05-05 NOTE — PROGRESS NOTES
there are no new circumstances to warrant reassessment.      Protective Factors: denial of impulses, hopeful for improved circumstances, future goals, and making progress in treatment      ICD-10-CM    1. Major depressive disorder, recurrent episode, moderate (HCC)  F33.1               Psychotherapy Intervention:  Modalities:  Problem-solving    Processed her caregiving role for her .  He has first round of chemotherapy on 4/29/25.  Have SE of N/V, low dietary intake.  States he is not taking the medications prescribed to manage these SE.  He is very fatigued, nauseous and uncomfortable.  Has only eaten a few crackers and mashed potatoes for the past week.  Problem-solved the following:    Remain as optimistic as possible.  Be empathetic, but do encourage David to work with you.  Continue problem-solving.  Really brainstorm best options.  [place] with optimal sleeping arrangement.   Make plan about family contacts that works.  There is nothing new to tell them.  If something changes, you will let them know.  Give them a job.   Talk to David about how you will deal with any unexpected events. What to tell family vs. not.     Care coordination:   [facility].  Auvelity.    Treatment goal(s) addressed: #1 below    Plan:  Homework/recommendations: See above.    Follow up: 3 weeks, scheduled for 5/28/25    Electronically signed by Jess Dorantes, PhD    _____________________________________________________________________________________    TREATMENT PLAN:     Date created: 5/16/23  Date last updated: 8/20/24     Goal #1: Problem:  Mixed Depression & Anxiety  Description: PHQ-9 (intake) = 5 (mild depression). PRIYANKA-7 = 4 (subclinical anxiety). Symptoms:  insomnia (4-5 hours on week nights due to working on projects), fatigue, feeling nervous/on edge, worries about a number of stressors, difficulty relaxing, mild irritability, and stress eating. Procrastination.  Goal:  To decrease the frequency and

## 2025-05-27 ASSESSMENT — ANXIETY QUESTIONNAIRES
5. BEING SO RESTLESS THAT IT IS HARD TO SIT STILL: NOT AT ALL
7. FEELING AFRAID AS IF SOMETHING AWFUL MIGHT HAPPEN: NOT AT ALL
2. NOT BEING ABLE TO STOP OR CONTROL WORRYING: NOT AT ALL
4. TROUBLE RELAXING: SEVERAL DAYS
3. WORRYING TOO MUCH ABOUT DIFFERENT THINGS: SEVERAL DAYS
6. BECOMING EASILY ANNOYED OR IRRITABLE: NOT AT ALL
7. FEELING AFRAID AS IF SOMETHING AWFUL MIGHT HAPPEN: NOT AT ALL
2. NOT BEING ABLE TO STOP OR CONTROL WORRYING: NOT AT ALL
1. FEELING NERVOUS, ANXIOUS, OR ON EDGE: SEVERAL DAYS
3. WORRYING TOO MUCH ABOUT DIFFERENT THINGS: SEVERAL DAYS
IF YOU CHECKED OFF ANY PROBLEMS ON THIS QUESTIONNAIRE, HOW DIFFICULT HAVE THESE PROBLEMS MADE IT FOR YOU TO DO YOUR WORK, TAKE CARE OF THINGS AT HOME, OR GET ALONG WITH OTHER PEOPLE: SOMEWHAT DIFFICULT
1. FEELING NERVOUS, ANXIOUS, OR ON EDGE: SEVERAL DAYS
4. TROUBLE RELAXING: SEVERAL DAYS
6. BECOMING EASILY ANNOYED OR IRRITABLE: NOT AT ALL
5. BEING SO RESTLESS THAT IT IS HARD TO SIT STILL: NOT AT ALL
IF YOU CHECKED OFF ANY PROBLEMS ON THIS QUESTIONNAIRE, HOW DIFFICULT HAVE THESE PROBLEMS MADE IT FOR YOU TO DO YOUR WORK, TAKE CARE OF THINGS AT HOME, OR GET ALONG WITH OTHER PEOPLE: SOMEWHAT DIFFICULT
GAD7 TOTAL SCORE: 3

## 2025-05-27 ASSESSMENT — PATIENT HEALTH QUESTIONNAIRE - PHQ9
SUM OF ALL RESPONSES TO PHQ QUESTIONS 1-9: 5
7. TROUBLE CONCENTRATING ON THINGS, SUCH AS READING THE NEWSPAPER OR WATCHING TELEVISION: NOT AT ALL
8. MOVING OR SPEAKING SO SLOWLY THAT OTHER PEOPLE COULD HAVE NOTICED. OR THE OPPOSITE - BEING SO FIDGETY OR RESTLESS THAT YOU HAVE BEEN MOVING AROUND A LOT MORE THAN USUAL: NOT AT ALL
7. TROUBLE CONCENTRATING ON THINGS, SUCH AS READING THE NEWSPAPER OR WATCHING TELEVISION: NOT AT ALL
4. FEELING TIRED OR HAVING LITTLE ENERGY: SEVERAL DAYS
6. FEELING BAD ABOUT YOURSELF - OR THAT YOU ARE A FAILURE OR HAVE LET YOURSELF OR YOUR FAMILY DOWN: NOT AT ALL
SUM OF ALL RESPONSES TO PHQ QUESTIONS 1-9: 5
3. TROUBLE FALLING OR STAYING ASLEEP: SEVERAL DAYS
9. THOUGHTS THAT YOU WOULD BE BETTER OFF DEAD, OR OF HURTING YOURSELF: NOT AT ALL
1. LITTLE INTEREST OR PLEASURE IN DOING THINGS: NOT AT ALL
5. POOR APPETITE OR OVEREATING: MORE THAN HALF THE DAYS
10. IF YOU CHECKED OFF ANY PROBLEMS, HOW DIFFICULT HAVE THESE PROBLEMS MADE IT FOR YOU TO DO YOUR WORK, TAKE CARE OF THINGS AT HOME, OR GET ALONG WITH OTHER PEOPLE: SOMEWHAT DIFFICULT
4. FEELING TIRED OR HAVING LITTLE ENERGY: SEVERAL DAYS
SUM OF ALL RESPONSES TO PHQ QUESTIONS 1-9: 5
SUM OF ALL RESPONSES TO PHQ QUESTIONS 1-9: 5
2. FEELING DOWN, DEPRESSED OR HOPELESS: SEVERAL DAYS
3. TROUBLE FALLING OR STAYING ASLEEP: SEVERAL DAYS
8. MOVING OR SPEAKING SO SLOWLY THAT OTHER PEOPLE COULD HAVE NOTICED. OR THE OPPOSITE, BEING SO FIGETY OR RESTLESS THAT YOU HAVE BEEN MOVING AROUND A LOT MORE THAN USUAL: NOT AT ALL
6. FEELING BAD ABOUT YOURSELF - OR THAT YOU ARE A FAILURE OR HAVE LET YOURSELF OR YOUR FAMILY DOWN: NOT AT ALL
SUM OF ALL RESPONSES TO PHQ QUESTIONS 1-9: 5
9. THOUGHTS THAT YOU WOULD BE BETTER OFF DEAD, OR OF HURTING YOURSELF: NOT AT ALL
5. POOR APPETITE OR OVEREATING: MORE THAN HALF THE DAYS
10. IF YOU CHECKED OFF ANY PROBLEMS, HOW DIFFICULT HAVE THESE PROBLEMS MADE IT FOR YOU TO DO YOUR WORK, TAKE CARE OF THINGS AT HOME, OR GET ALONG WITH OTHER PEOPLE: SOMEWHAT DIFFICULT
1. LITTLE INTEREST OR PLEASURE IN DOING THINGS: NOT AT ALL
2. FEELING DOWN, DEPRESSED OR HOPELESS: SEVERAL DAYS

## 2025-05-28 ENCOUNTER — OFFICE VISIT (OUTPATIENT)
Dept: PSYCHOLOGY | Age: 40
End: 2025-05-28
Payer: COMMERCIAL

## 2025-05-28 DIAGNOSIS — F33.1 MAJOR DEPRESSIVE DISORDER, RECURRENT EPISODE, MODERATE (HCC): Primary | ICD-10-CM

## 2025-05-28 DIAGNOSIS — R74.8 ELEVATED ALKALINE PHOSPHATASE LEVEL: ICD-10-CM

## 2025-05-28 LAB
ALBUMIN: 4.2 G/DL (ref 3.5–5.2)
ALP BLD-CCNC: 98 U/L (ref 35–104)
ALT SERPL-CCNC: 30 U/L (ref 0–32)
ANION GAP SERPL CALCULATED.3IONS-SCNC: 14 MMOL/L (ref 7–16)
AST SERPL-CCNC: 27 U/L (ref 0–31)
BILIRUB SERPL-MCNC: 0.5 MG/DL (ref 0–1.2)
BUN BLDV-MCNC: 10 MG/DL (ref 6–20)
CALCIUM SERPL-MCNC: 9.6 MG/DL (ref 8.6–10.2)
CHLORIDE BLD-SCNC: 100 MMOL/L (ref 98–107)
CO2: 24 MMOL/L (ref 22–29)
CREAT SERPL-MCNC: 0.8 MG/DL (ref 0.5–1)
GFR, ESTIMATED: >90 ML/MIN/1.73M2
GLUCOSE BLD-MCNC: 87 MG/DL (ref 74–99)
POTASSIUM SERPL-SCNC: 4.3 MMOL/L (ref 3.5–5)
SODIUM BLD-SCNC: 138 MMOL/L (ref 132–146)
TOTAL PROTEIN: 7.3 G/DL (ref 6.4–8.3)

## 2025-05-28 PROCEDURE — 1036F TOBACCO NON-USER: CPT | Performed by: PSYCHOLOGIST

## 2025-05-28 PROCEDURE — 90834 PSYTX W PT 45 MINUTES: CPT | Performed by: PSYCHOLOGIST

## 2025-05-28 NOTE — PROGRESS NOTES
Behavioral Health Follow-Up   [facility]    Time Start: 9:00 am    Time End: 9:40 am  Date of Service:  5/28/2025  Session #: 33      Subjective:  is on radiation and chemotherapy for cancerous neck mass.  Has 7 days of treatment remaining. She is distressed in her caregiving role.     Symptoms reported: Frustration/stress    Objective:    Mental Status:    Appearance: Appears stated age and Well-kept   Sensorium: alert   Affect:  consistent with expectations based upon mood   Mood:   Stressed/anxious   Thought Process:  Goal-Directed   Thought Content: no evidence of impairment   Behavior:   Cooperative   Psychomotor: Tense   Speech:   appropriate quality, quantity and organization of sentences   Orientation:  oriented to person, place, time, and general circumstances   Judgment & Insight:  normal insight and judgment   Memory: Intact     PHQ-9 = 5  PRIYANKA-7 = 3      Assessment:   Progress/response to treatment:   Since initiation of treatment: Improved occupational-related organization and time management. 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  Improvements in dietary goals.  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    Since last visit:  Continuing to use strategies to manage stress of 's cancer treatment.    Risk: Focused assessment deferred, prior evaluation yielded minimal suicidal/homicidal risk and there are no new circumstances to warrant reassessment.      Protective Factors: denial of impulses, hopeful for improved circumstances, future goals, and making progress in treatment      ICD-10-CM    1. Major depressive disorder, recurrent episode, moderate (HCC)  F33.1             Psychotherapy Intervention:  Modalities:  Problem-solving    Processed her caregiving role for her .  He is nearing the end of treatment, however has experienced significant weight loss due to poor dietary intake.

## 2025-06-05 ENCOUNTER — OFFICE VISIT (OUTPATIENT)
Dept: FAMILY MEDICINE CLINIC | Age: 40
End: 2025-06-05
Payer: COMMERCIAL

## 2025-06-05 VITALS
RESPIRATION RATE: 18 BRPM | BODY MASS INDEX: 35.48 KG/M2 | DIASTOLIC BLOOD PRESSURE: 78 MMHG | SYSTOLIC BLOOD PRESSURE: 124 MMHG | HEIGHT: 64 IN | HEART RATE: 104 BPM | WEIGHT: 207.8 LBS | TEMPERATURE: 98.2 F | OXYGEN SATURATION: 98 %

## 2025-06-05 DIAGNOSIS — E78.5 HYPERLIPIDEMIA, UNSPECIFIED HYPERLIPIDEMIA TYPE: ICD-10-CM

## 2025-06-05 DIAGNOSIS — E03.9 ACQUIRED HYPOTHYROIDISM: Primary | ICD-10-CM

## 2025-06-05 DIAGNOSIS — E66.811 CLASS 1 OBESITY DUE TO EXCESS CALORIES WITHOUT SERIOUS COMORBIDITY WITH BODY MASS INDEX (BMI) OF 33.0 TO 33.9 IN ADULT: ICD-10-CM

## 2025-06-05 DIAGNOSIS — Z98.84 S/P LAPAROSCOPIC SLEEVE GASTRECTOMY: ICD-10-CM

## 2025-06-05 DIAGNOSIS — E66.09 CLASS 1 OBESITY DUE TO EXCESS CALORIES WITHOUT SERIOUS COMORBIDITY WITH BODY MASS INDEX (BMI) OF 33.0 TO 33.9 IN ADULT: ICD-10-CM

## 2025-06-05 DIAGNOSIS — J45.990 EXERCISE-INDUCED ASTHMA: ICD-10-CM

## 2025-06-05 DIAGNOSIS — F33.1 MAJOR DEPRESSIVE DISORDER, RECURRENT EPISODE, MODERATE (HCC): ICD-10-CM

## 2025-06-05 PROCEDURE — G8417 CALC BMI ABV UP PARAM F/U: HCPCS | Performed by: FAMILY MEDICINE

## 2025-06-05 PROCEDURE — G8427 DOCREV CUR MEDS BY ELIG CLIN: HCPCS | Performed by: FAMILY MEDICINE

## 2025-06-05 PROCEDURE — 99214 OFFICE O/P EST MOD 30 MIN: CPT | Performed by: FAMILY MEDICINE

## 2025-06-05 PROCEDURE — 1036F TOBACCO NON-USER: CPT | Performed by: FAMILY MEDICINE

## 2025-06-05 RX ORDER — DEXTROMETHORPHAN HYDROBROMIDE, BUPROPION HYDROCHLORIDE 105; 45 MG/1; MG/1
1 TABLET, MULTILAYER, EXTENDED RELEASE ORAL NIGHTLY
COMMUNITY
Start: 2025-05-12

## 2025-06-05 NOTE — PROGRESS NOTES
entry; no increased work of breathing  Cardiovascular - normal rate, regular rhythm, normal S1, S2, no murmurs, rubs, clicks or gallops  Extremities - no edema noted  Abdomen - soft, nontender, nondistended  Skin - normal coloration and turgor, no rashes, no suspicious skin lesions noted      A / P:     Diagnosis Orders   1. Acquired hypothyroidism  TSH      2. Major depressive disorder, recurrent episode, moderate (HCC)        3. Hyperlipidemia, unspecified hyperlipidemia type        4. Exercise-induced asthma        5. Class 1 obesity due to excess calories without serious comorbidity with body mass index (BMI) of 33.0 to 33.9 in adult        6. S/P laparoscopic sleeve gastrectomy              Continue synthroid 112mcg  Effexor per psych  F/u with med changes per psych  continue to work on diet and exercise  Continue to alternate flonase, azelastine every other day -- per previous      RTO: Return in about 4 months (around 10/5/2025) for chronic disease / routine f/u.        An electronic signature was used to authenticate this note.  ---- Whitley Jeronimo MD on 6/5/2025 at 3:56 PM

## 2025-06-29 ASSESSMENT — PATIENT HEALTH QUESTIONNAIRE - PHQ9
5. POOR APPETITE OR OVEREATING: SEVERAL DAYS
2. FEELING DOWN, DEPRESSED OR HOPELESS: SEVERAL DAYS
6. FEELING BAD ABOUT YOURSELF - OR THAT YOU ARE A FAILURE OR HAVE LET YOURSELF OR YOUR FAMILY DOWN: SEVERAL DAYS
7. TROUBLE CONCENTRATING ON THINGS, SUCH AS READING THE NEWSPAPER OR WATCHING TELEVISION: NOT AT ALL
3. TROUBLE FALLING OR STAYING ASLEEP: SEVERAL DAYS
9. THOUGHTS THAT YOU WOULD BE BETTER OFF DEAD, OR OF HURTING YOURSELF: NOT AT ALL
4. FEELING TIRED OR HAVING LITTLE ENERGY: SEVERAL DAYS
8. MOVING OR SPEAKING SO SLOWLY THAT OTHER PEOPLE COULD HAVE NOTICED. OR THE OPPOSITE - BEING SO FIDGETY OR RESTLESS THAT YOU HAVE BEEN MOVING AROUND A LOT MORE THAN USUAL: NOT AT ALL
3. TROUBLE FALLING OR STAYING ASLEEP: SEVERAL DAYS
SUM OF ALL RESPONSES TO PHQ QUESTIONS 1-9: 5
5. POOR APPETITE OR OVEREATING: SEVERAL DAYS
10. IF YOU CHECKED OFF ANY PROBLEMS, HOW DIFFICULT HAVE THESE PROBLEMS MADE IT FOR YOU TO DO YOUR WORK, TAKE CARE OF THINGS AT HOME, OR GET ALONG WITH OTHER PEOPLE: SOMEWHAT DIFFICULT
SUM OF ALL RESPONSES TO PHQ QUESTIONS 1-9: 5
1. LITTLE INTEREST OR PLEASURE IN DOING THINGS: NOT AT ALL
4. FEELING TIRED OR HAVING LITTLE ENERGY: SEVERAL DAYS
2. FEELING DOWN, DEPRESSED OR HOPELESS: SEVERAL DAYS
7. TROUBLE CONCENTRATING ON THINGS, SUCH AS READING THE NEWSPAPER OR WATCHING TELEVISION: NOT AT ALL
1. LITTLE INTEREST OR PLEASURE IN DOING THINGS: NOT AT ALL
9. THOUGHTS THAT YOU WOULD BE BETTER OFF DEAD, OR OF HURTING YOURSELF: NOT AT ALL
6. FEELING BAD ABOUT YOURSELF - OR THAT YOU ARE A FAILURE OR HAVE LET YOURSELF OR YOUR FAMILY DOWN: SEVERAL DAYS
SUM OF ALL RESPONSES TO PHQ QUESTIONS 1-9: 5
8. MOVING OR SPEAKING SO SLOWLY THAT OTHER PEOPLE COULD HAVE NOTICED. OR THE OPPOSITE, BEING SO FIGETY OR RESTLESS THAT YOU HAVE BEEN MOVING AROUND A LOT MORE THAN USUAL: NOT AT ALL
10. IF YOU CHECKED OFF ANY PROBLEMS, HOW DIFFICULT HAVE THESE PROBLEMS MADE IT FOR YOU TO DO YOUR WORK, TAKE CARE OF THINGS AT HOME, OR GET ALONG WITH OTHER PEOPLE: SOMEWHAT DIFFICULT
SUM OF ALL RESPONSES TO PHQ QUESTIONS 1-9: 5
SUM OF ALL RESPONSES TO PHQ QUESTIONS 1-9: 5

## 2025-06-29 ASSESSMENT — ANXIETY QUESTIONNAIRES
GAD7 TOTAL SCORE: 7
4. TROUBLE RELAXING: SEVERAL DAYS
1. FEELING NERVOUS, ANXIOUS, OR ON EDGE: MORE THAN HALF THE DAYS
4. TROUBLE RELAXING: SEVERAL DAYS
2. NOT BEING ABLE TO STOP OR CONTROL WORRYING: SEVERAL DAYS
IF YOU CHECKED OFF ANY PROBLEMS ON THIS QUESTIONNAIRE, HOW DIFFICULT HAVE THESE PROBLEMS MADE IT FOR YOU TO DO YOUR WORK, TAKE CARE OF THINGS AT HOME, OR GET ALONG WITH OTHER PEOPLE: SOMEWHAT DIFFICULT
7. FEELING AFRAID AS IF SOMETHING AWFUL MIGHT HAPPEN: NOT AT ALL
3. WORRYING TOO MUCH ABOUT DIFFERENT THINGS: SEVERAL DAYS
6. BECOMING EASILY ANNOYED OR IRRITABLE: MORE THAN HALF THE DAYS
IF YOU CHECKED OFF ANY PROBLEMS ON THIS QUESTIONNAIRE, HOW DIFFICULT HAVE THESE PROBLEMS MADE IT FOR YOU TO DO YOUR WORK, TAKE CARE OF THINGS AT HOME, OR GET ALONG WITH OTHER PEOPLE: SOMEWHAT DIFFICULT
5. BEING SO RESTLESS THAT IT IS HARD TO SIT STILL: NOT AT ALL
3. WORRYING TOO MUCH ABOUT DIFFERENT THINGS: SEVERAL DAYS
6. BECOMING EASILY ANNOYED OR IRRITABLE: MORE THAN HALF THE DAYS
1. FEELING NERVOUS, ANXIOUS, OR ON EDGE: MORE THAN HALF THE DAYS
5. BEING SO RESTLESS THAT IT IS HARD TO SIT STILL: NOT AT ALL
2. NOT BEING ABLE TO STOP OR CONTROL WORRYING: SEVERAL DAYS
7. FEELING AFRAID AS IF SOMETHING AWFUL MIGHT HAPPEN: NOT AT ALL

## 2025-06-30 ENCOUNTER — OFFICE VISIT (OUTPATIENT)
Dept: PSYCHOLOGY | Age: 40
End: 2025-06-30
Payer: COMMERCIAL

## 2025-06-30 DIAGNOSIS — Z63.0 MARITAL PROBLEMS: ICD-10-CM

## 2025-06-30 DIAGNOSIS — F33.0 MAJOR DEPRESSIVE DISORDER, RECURRENT EPISODE, MILD WITH ANXIOUS DISTRESS: Primary | ICD-10-CM

## 2025-06-30 PROCEDURE — 1036F TOBACCO NON-USER: CPT | Performed by: PSYCHOLOGIST

## 2025-06-30 PROCEDURE — 90834 PSYTX W PT 45 MINUTES: CPT | Performed by: PSYCHOLOGIST

## 2025-06-30 SDOH — SOCIAL STABILITY - SOCIAL INSECURITY: PROBLEMS IN RELATIONSHIP WITH SPOUSE OR PARTNER: Z63.0

## 2025-06-30 NOTE — PROGRESS NOTES
Behavioral Health Follow-Up   [facility]    Time Start: 11:30 am    Time End:  12:15 pm  Date of Service:  6/30/2025  Session #: 34        Subjective:  finished treatment for cancerous neck mass.  Frustrations with finances and his lack of employment continue. 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    Symptoms reported: Resentment    Objective:    Mental Status:    Appearance: Appears stated age and Well-kept   Sensorium: alert   Affect:  consistent with expectations based upon mood   Mood:   Stressed/anxious   Thought Process:  Goal-Directed   Thought Content: no evidence of impairment   Behavior:   Cooperative   Psychomotor: Tense   Speech:   appropriate quality, quantity and organization of sentences   Orientation:  oriented to person, place, time, and general circumstances   Judgment & Insight:  normal insight and judgment   Memory: Intact     PHQ-9 = 5  PRIYANKA-7 = 7      Assessment:   Progress/response to treatment:   Since initiation of treatment: Improved occupational-related organization and time management. 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  Improvements in dietary goals.  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    Since last visit:   completed cancer treatment.      Risk: Focused assessment deferred, prior evaluation yielded minimal suicidal/homicidal risk and there are no new circumstances to warrant reassessment.      Protective Factors: denial of impulses, hopeful for improved circumstances, future goals, and making progress in treatment      ICD-10-CM    1. Major depressive disorder, recurrent episode, mild with anxious distress  F33.0       2. Marital problems  Z63.0               Psychotherapy Intervention:  Modalities:  Problem-solving; Cognitive Behavioral

## 2025-07-01 PROBLEM — F33.0 MAJOR DEPRESSIVE DISORDER, RECURRENT EPISODE, MILD WITH ANXIOUS DISTRESS: Status: ACTIVE | Noted: 2025-07-01

## 2025-07-26 ASSESSMENT — ANXIETY QUESTIONNAIRES
2. NOT BEING ABLE TO STOP OR CONTROL WORRYING: NOT AT ALL
7. FEELING AFRAID AS IF SOMETHING AWFUL MIGHT HAPPEN: NOT AT ALL
1. FEELING NERVOUS, ANXIOUS, OR ON EDGE: SEVERAL DAYS
IF YOU CHECKED OFF ANY PROBLEMS ON THIS QUESTIONNAIRE, HOW DIFFICULT HAVE THESE PROBLEMS MADE IT FOR YOU TO DO YOUR WORK, TAKE CARE OF THINGS AT HOME, OR GET ALONG WITH OTHER PEOPLE: SOMEWHAT DIFFICULT
1. FEELING NERVOUS, ANXIOUS, OR ON EDGE: SEVERAL DAYS
5. BEING SO RESTLESS THAT IT IS HARD TO SIT STILL: NOT AT ALL
GAD7 TOTAL SCORE: 4
4. TROUBLE RELAXING: SEVERAL DAYS
3. WORRYING TOO MUCH ABOUT DIFFERENT THINGS: SEVERAL DAYS
5. BEING SO RESTLESS THAT IT IS HARD TO SIT STILL: NOT AT ALL
IF YOU CHECKED OFF ANY PROBLEMS ON THIS QUESTIONNAIRE, HOW DIFFICULT HAVE THESE PROBLEMS MADE IT FOR YOU TO DO YOUR WORK, TAKE CARE OF THINGS AT HOME, OR GET ALONG WITH OTHER PEOPLE: SOMEWHAT DIFFICULT
2. NOT BEING ABLE TO STOP OR CONTROL WORRYING: NOT AT ALL
7. FEELING AFRAID AS IF SOMETHING AWFUL MIGHT HAPPEN: NOT AT ALL
3. WORRYING TOO MUCH ABOUT DIFFERENT THINGS: SEVERAL DAYS
4. TROUBLE RELAXING: SEVERAL DAYS
6. BECOMING EASILY ANNOYED OR IRRITABLE: SEVERAL DAYS
6. BECOMING EASILY ANNOYED OR IRRITABLE: SEVERAL DAYS

## 2025-07-26 ASSESSMENT — PATIENT HEALTH QUESTIONNAIRE - PHQ9
1. LITTLE INTEREST OR PLEASURE IN DOING THINGS: NOT AT ALL
9. THOUGHTS THAT YOU WOULD BE BETTER OFF DEAD, OR OF HURTING YOURSELF: NOT AT ALL
3. TROUBLE FALLING OR STAYING ASLEEP: SEVERAL DAYS
7. TROUBLE CONCENTRATING ON THINGS, SUCH AS READING THE NEWSPAPER OR WATCHING TELEVISION: NOT AT ALL
SUM OF ALL RESPONSES TO PHQ QUESTIONS 1-9: 2
10. IF YOU CHECKED OFF ANY PROBLEMS, HOW DIFFICULT HAVE THESE PROBLEMS MADE IT FOR YOU TO DO YOUR WORK, TAKE CARE OF THINGS AT HOME, OR GET ALONG WITH OTHER PEOPLE: SOMEWHAT DIFFICULT
1. LITTLE INTEREST OR PLEASURE IN DOING THINGS: NOT AT ALL
9. THOUGHTS THAT YOU WOULD BE BETTER OFF DEAD, OR OF HURTING YOURSELF: NOT AT ALL
SUM OF ALL RESPONSES TO PHQ QUESTIONS 1-9: 2
5. POOR APPETITE OR OVEREATING: NOT AT ALL
7. TROUBLE CONCENTRATING ON THINGS, SUCH AS READING THE NEWSPAPER OR WATCHING TELEVISION: NOT AT ALL
10. IF YOU CHECKED OFF ANY PROBLEMS, HOW DIFFICULT HAVE THESE PROBLEMS MADE IT FOR YOU TO DO YOUR WORK, TAKE CARE OF THINGS AT HOME, OR GET ALONG WITH OTHER PEOPLE: SOMEWHAT DIFFICULT
SUM OF ALL RESPONSES TO PHQ QUESTIONS 1-9: 2
4. FEELING TIRED OR HAVING LITTLE ENERGY: NOT AT ALL
8. MOVING OR SPEAKING SO SLOWLY THAT OTHER PEOPLE COULD HAVE NOTICED. OR THE OPPOSITE, BEING SO FIGETY OR RESTLESS THAT YOU HAVE BEEN MOVING AROUND A LOT MORE THAN USUAL: NOT AT ALL
2. FEELING DOWN, DEPRESSED OR HOPELESS: SEVERAL DAYS
8. MOVING OR SPEAKING SO SLOWLY THAT OTHER PEOPLE COULD HAVE NOTICED. OR THE OPPOSITE - BEING SO FIDGETY OR RESTLESS THAT YOU HAVE BEEN MOVING AROUND A LOT MORE THAN USUAL: NOT AT ALL
SUM OF ALL RESPONSES TO PHQ QUESTIONS 1-9: 2
6. FEELING BAD ABOUT YOURSELF - OR THAT YOU ARE A FAILURE OR HAVE LET YOURSELF OR YOUR FAMILY DOWN: NOT AT ALL
6. FEELING BAD ABOUT YOURSELF - OR THAT YOU ARE A FAILURE OR HAVE LET YOURSELF OR YOUR FAMILY DOWN: NOT AT ALL
3. TROUBLE FALLING OR STAYING ASLEEP: SEVERAL DAYS
SUM OF ALL RESPONSES TO PHQ QUESTIONS 1-9: 2
4. FEELING TIRED OR HAVING LITTLE ENERGY: NOT AT ALL
5. POOR APPETITE OR OVEREATING: NOT AT ALL
2. FEELING DOWN, DEPRESSED OR HOPELESS: SEVERAL DAYS

## 2025-07-29 ENCOUNTER — OFFICE VISIT (OUTPATIENT)
Dept: PSYCHOLOGY | Age: 40
End: 2025-07-29
Payer: COMMERCIAL

## 2025-07-29 DIAGNOSIS — F33.41 MAJOR DEPRESSIVE DISORDER, RECURRENT EPISODE, IN PARTIAL REMISSION WITH ANXIOUS DISTRESS: Primary | ICD-10-CM

## 2025-07-29 PROCEDURE — 90834 PSYTX W PT 45 MINUTES: CPT | Performed by: PSYCHOLOGIST

## 2025-07-29 PROCEDURE — 1036F TOBACCO NON-USER: CPT | Performed by: PSYCHOLOGIST

## 2025-07-30 NOTE — PROGRESS NOTES
Behavioral Health Follow-Up   [facility]    Time Start: 2:00 pm    Time End:  2:45 pm  Date of Service:  7/29/2025  Session #: 35        Subjective: Reporting improvements in her stress with 's cancer treatment concluding, successful progress with him regarding his employment, and recently traveling to her hometown in [place] to see her parents.     Symptoms reported: Stress    Objective:    Mental Status:    Appearance: Appears stated age and Well-kept   Sensorium: alert   Affect:  consistent with expectations based upon mood   Mood:   Content   Thought Process:  Goal-Directed   Thought Content: no evidence of impairment   Behavior:   Cooperative   Psychomotor: Relaxed   Speech:   appropriate quality, quantity and organization of sentences   Orientation:  oriented to person, place, time, and general circumstances   Judgment & Insight:  normal insight and judgment   Memory: Intact     PHQ-9 = 2  PRIYANKA-7 = 4      Assessment:   Progress/response to treatment:   Since initiation of treatment: Improved occupational-related organization and time management. 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  Improvements in dietary goals.  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    Since last visit:   completed cancer treatment.      Risk: Denies SI/HI    Protective Factors: denial of impulses, hopeful for improved circumstances, future goals, and making progress in treatment      ICD-10-CM    1. Major depressive disorder, recurrent episode, in partial remission with anxious distress  F33.41               Psychotherapy Intervention:  Modalities:  Problem-solving; Cognitive Behavioral Therapy    Her  has not worked a sufficiently paying job consistently during the 5 years of their marriage.  Last visit discussed how to approach this issue with him and set expectation that he get a job.  She was able to have the

## 2025-08-30 ASSESSMENT — ANXIETY QUESTIONNAIRES
3. WORRYING TOO MUCH ABOUT DIFFERENT THINGS: NOT AT ALL
6. BECOMING EASILY ANNOYED OR IRRITABLE: SEVERAL DAYS
5. BEING SO RESTLESS THAT IT IS HARD TO SIT STILL: NOT AT ALL
6. BECOMING EASILY ANNOYED OR IRRITABLE: SEVERAL DAYS
7. FEELING AFRAID AS IF SOMETHING AWFUL MIGHT HAPPEN: NOT AT ALL
2. NOT BEING ABLE TO STOP OR CONTROL WORRYING: NOT AT ALL
4. TROUBLE RELAXING: SEVERAL DAYS
4. TROUBLE RELAXING: SEVERAL DAYS
IF YOU CHECKED OFF ANY PROBLEMS ON THIS QUESTIONNAIRE, HOW DIFFICULT HAVE THESE PROBLEMS MADE IT FOR YOU TO DO YOUR WORK, TAKE CARE OF THINGS AT HOME, OR GET ALONG WITH OTHER PEOPLE: NOT DIFFICULT AT ALL
1. FEELING NERVOUS, ANXIOUS, OR ON EDGE: SEVERAL DAYS
3. WORRYING TOO MUCH ABOUT DIFFERENT THINGS: NOT AT ALL
2. NOT BEING ABLE TO STOP OR CONTROL WORRYING: NOT AT ALL
GAD7 TOTAL SCORE: 3
IF YOU CHECKED OFF ANY PROBLEMS ON THIS QUESTIONNAIRE, HOW DIFFICULT HAVE THESE PROBLEMS MADE IT FOR YOU TO DO YOUR WORK, TAKE CARE OF THINGS AT HOME, OR GET ALONG WITH OTHER PEOPLE: NOT DIFFICULT AT ALL
7. FEELING AFRAID AS IF SOMETHING AWFUL MIGHT HAPPEN: NOT AT ALL
5. BEING SO RESTLESS THAT IT IS HARD TO SIT STILL: NOT AT ALL
1. FEELING NERVOUS, ANXIOUS, OR ON EDGE: SEVERAL DAYS

## 2025-08-30 ASSESSMENT — PATIENT HEALTH QUESTIONNAIRE - PHQ9
6. FEELING BAD ABOUT YOURSELF - OR THAT YOU ARE A FAILURE OR HAVE LET YOURSELF OR YOUR FAMILY DOWN: NOT AT ALL
1. LITTLE INTEREST OR PLEASURE IN DOING THINGS: SEVERAL DAYS
9. THOUGHTS THAT YOU WOULD BE BETTER OFF DEAD, OR OF HURTING YOURSELF: NOT AT ALL
8. MOVING OR SPEAKING SO SLOWLY THAT OTHER PEOPLE COULD HAVE NOTICED. OR THE OPPOSITE - BEING SO FIDGETY OR RESTLESS THAT YOU HAVE BEEN MOVING AROUND A LOT MORE THAN USUAL: NOT AT ALL
1. LITTLE INTEREST OR PLEASURE IN DOING THINGS: SEVERAL DAYS
2. FEELING DOWN, DEPRESSED OR HOPELESS: NOT AT ALL
SUM OF ALL RESPONSES TO PHQ QUESTIONS 1-9: 5
8. MOVING OR SPEAKING SO SLOWLY THAT OTHER PEOPLE COULD HAVE NOTICED. OR THE OPPOSITE, BEING SO FIGETY OR RESTLESS THAT YOU HAVE BEEN MOVING AROUND A LOT MORE THAN USUAL: NOT AT ALL
4. FEELING TIRED OR HAVING LITTLE ENERGY: SEVERAL DAYS
SUM OF ALL RESPONSES TO PHQ QUESTIONS 1-9: 5
2. FEELING DOWN, DEPRESSED OR HOPELESS: NOT AT ALL
5. POOR APPETITE OR OVEREATING: SEVERAL DAYS
10. IF YOU CHECKED OFF ANY PROBLEMS, HOW DIFFICULT HAVE THESE PROBLEMS MADE IT FOR YOU TO DO YOUR WORK, TAKE CARE OF THINGS AT HOME, OR GET ALONG WITH OTHER PEOPLE: SOMEWHAT DIFFICULT
3. TROUBLE FALLING OR STAYING ASLEEP: MORE THAN HALF THE DAYS
SUM OF ALL RESPONSES TO PHQ QUESTIONS 1-9: 5
9. THOUGHTS THAT YOU WOULD BE BETTER OFF DEAD, OR OF HURTING YOURSELF: NOT AT ALL
SUM OF ALL RESPONSES TO PHQ QUESTIONS 1-9: 5
4. FEELING TIRED OR HAVING LITTLE ENERGY: SEVERAL DAYS
7. TROUBLE CONCENTRATING ON THINGS, SUCH AS READING THE NEWSPAPER OR WATCHING TELEVISION: NOT AT ALL
5. POOR APPETITE OR OVEREATING: SEVERAL DAYS
10. IF YOU CHECKED OFF ANY PROBLEMS, HOW DIFFICULT HAVE THESE PROBLEMS MADE IT FOR YOU TO DO YOUR WORK, TAKE CARE OF THINGS AT HOME, OR GET ALONG WITH OTHER PEOPLE: SOMEWHAT DIFFICULT
7. TROUBLE CONCENTRATING ON THINGS, SUCH AS READING THE NEWSPAPER OR WATCHING TELEVISION: NOT AT ALL
SUM OF ALL RESPONSES TO PHQ QUESTIONS 1-9: 5
3. TROUBLE FALLING OR STAYING ASLEEP: MORE THAN HALF THE DAYS
6. FEELING BAD ABOUT YOURSELF - OR THAT YOU ARE A FAILURE OR HAVE LET YOURSELF OR YOUR FAMILY DOWN: NOT AT ALL

## 2025-09-01 ENCOUNTER — PATIENT MESSAGE (OUTPATIENT)
Dept: FAMILY MEDICINE CLINIC | Age: 40
End: 2025-09-01

## 2025-09-02 ENCOUNTER — OFFICE VISIT (OUTPATIENT)
Dept: PSYCHOLOGY | Age: 40
End: 2025-09-02
Payer: COMMERCIAL

## 2025-09-02 DIAGNOSIS — F33.41 MAJOR DEPRESSIVE DISORDER, RECURRENT EPISODE, IN PARTIAL REMISSION WITH ANXIOUS DISTRESS: Primary | ICD-10-CM

## 2025-09-02 PROCEDURE — 1036F TOBACCO NON-USER: CPT | Performed by: PSYCHOLOGIST

## 2025-09-02 PROCEDURE — 90837 PSYTX W PT 60 MINUTES: CPT | Performed by: PSYCHOLOGIST
